# Patient Record
Sex: FEMALE | Race: WHITE | URBAN - METROPOLITAN AREA
[De-identification: names, ages, dates, MRNs, and addresses within clinical notes are randomized per-mention and may not be internally consistent; named-entity substitution may affect disease eponyms.]

---

## 2018-03-28 ENCOUNTER — OFFICE VISIT (OUTPATIENT)
Dept: NEUROSURGERY | Age: 83
End: 2018-03-28
Payer: MEDICARE

## 2018-03-28 VITALS — DIASTOLIC BLOOD PRESSURE: 70 MMHG | HEART RATE: 83 BPM | SYSTOLIC BLOOD PRESSURE: 131 MMHG

## 2018-03-28 DIAGNOSIS — M48.061 DEGENERATIVE LUMBAR SPINAL STENOSIS: ICD-10-CM

## 2018-03-28 PROCEDURE — 99203 OFFICE O/P NEW LOW 30 MIN: CPT | Performed by: NEUROLOGICAL SURGERY

## 2018-03-28 PROCEDURE — 4040F PNEUMOC VAC/ADMIN/RCVD: CPT | Performed by: NEUROLOGICAL SURGERY

## 2018-03-28 PROCEDURE — 1123F ACP DISCUSS/DSCN MKR DOCD: CPT | Performed by: NEUROLOGICAL SURGERY

## 2018-03-28 PROCEDURE — 1036F TOBACCO NON-USER: CPT | Performed by: NEUROLOGICAL SURGERY

## 2018-03-28 PROCEDURE — G8419 CALC BMI OUT NRM PARAM NOF/U: HCPCS | Performed by: NEUROLOGICAL SURGERY

## 2018-03-28 PROCEDURE — G8427 DOCREV CUR MEDS BY ELIG CLIN: HCPCS | Performed by: NEUROLOGICAL SURGERY

## 2018-03-28 PROCEDURE — G8484 FLU IMMUNIZE NO ADMIN: HCPCS | Performed by: NEUROLOGICAL SURGERY

## 2018-03-28 PROCEDURE — 1090F PRES/ABSN URINE INCON ASSESS: CPT | Performed by: NEUROLOGICAL SURGERY

## 2018-04-02 ENCOUNTER — TELEPHONE (OUTPATIENT)
Dept: NEUROLOGY | Age: 83
End: 2018-04-02

## 2018-04-04 ENCOUNTER — TELEPHONE (OUTPATIENT)
Dept: NEUROLOGY | Age: 83
End: 2018-04-04

## 2018-08-31 ENCOUNTER — APPOINTMENT (OUTPATIENT)
Dept: GENERAL RADIOLOGY | Age: 83
DRG: 056 | End: 2018-08-31
Payer: MEDICARE

## 2018-08-31 ENCOUNTER — HOSPITAL ENCOUNTER (INPATIENT)
Age: 83
LOS: 6 days | Discharge: NURSING, REHAB OR LONG-TERM CARE | DRG: 056 | End: 2018-09-06
Attending: EMERGENCY MEDICINE | Admitting: INTERNAL MEDICINE
Payer: MEDICARE

## 2018-08-31 ENCOUNTER — APPOINTMENT (OUTPATIENT)
Dept: CT IMAGING | Age: 83
DRG: 056 | End: 2018-08-31
Payer: MEDICARE

## 2018-08-31 DIAGNOSIS — R13.10 DYSPHAGIA, UNSPECIFIED TYPE: Primary | ICD-10-CM

## 2018-08-31 DIAGNOSIS — K59.00 CONSTIPATION, UNSPECIFIED CONSTIPATION TYPE: ICD-10-CM

## 2018-08-31 LAB
ALBUMIN SERPL-MCNC: 3.5 G/DL (ref 3.5–5.2)
ALP BLD-CCNC: 92 U/L (ref 35–104)
ALT SERPL-CCNC: 24 U/L (ref 0–32)
AMORPHOUS: ABNORMAL
ANION GAP SERPL CALCULATED.3IONS-SCNC: 15 MMOL/L (ref 7–16)
APTT: 27.7 SEC (ref 24.5–35.1)
AST SERPL-CCNC: 37 U/L (ref 0–31)
BACTERIA: ABNORMAL /HPF
BASOPHILS ABSOLUTE: 0.01 E9/L (ref 0–0.2)
BASOPHILS RELATIVE PERCENT: 0.1 % (ref 0–2)
BILIRUB SERPL-MCNC: 0.3 MG/DL (ref 0–1.2)
BILIRUBIN URINE: NEGATIVE
BLOOD, URINE: ABNORMAL
BUN BLDV-MCNC: 33 MG/DL (ref 8–23)
CALCIUM SERPL-MCNC: 9.7 MG/DL (ref 8.6–10.2)
CHLORIDE BLD-SCNC: 91 MMOL/L (ref 98–107)
CLARITY: ABNORMAL
CO2: 22 MMOL/L (ref 22–29)
COLOR: YELLOW
CREAT SERPL-MCNC: 1.1 MG/DL (ref 0.5–1)
EOSINOPHILS ABSOLUTE: 0 E9/L (ref 0.05–0.5)
EOSINOPHILS RELATIVE PERCENT: 0 % (ref 0–6)
GFR AFRICAN AMERICAN: 56
GFR NON-AFRICAN AMERICAN: 46 ML/MIN/1.73
GLUCOSE BLD-MCNC: 261 MG/DL (ref 74–109)
GLUCOSE URINE: NEGATIVE MG/DL
HCT VFR BLD CALC: 34.3 % (ref 34–48)
HEMOGLOBIN: 10.9 G/DL (ref 11.5–15.5)
IMMATURE GRANULOCYTES #: 0.03 E9/L
IMMATURE GRANULOCYTES %: 0.4 % (ref 0–5)
INR BLD: 1.1
KETONES, URINE: NEGATIVE MG/DL
LACTIC ACID: 2.6 MMOL/L (ref 0.5–2.2)
LEUKOCYTE ESTERASE, URINE: ABNORMAL
LIPASE: 42 U/L (ref 13–60)
LYMPHOCYTES ABSOLUTE: 0.93 E9/L (ref 1.5–4)
LYMPHOCYTES RELATIVE PERCENT: 12.4 % (ref 20–42)
MCH RBC QN AUTO: 29.8 PG (ref 26–35)
MCHC RBC AUTO-ENTMCNC: 31.8 % (ref 32–34.5)
MCV RBC AUTO: 93.7 FL (ref 80–99.9)
MONOCYTES ABSOLUTE: 0.23 E9/L (ref 0.1–0.95)
MONOCYTES RELATIVE PERCENT: 3.1 % (ref 2–12)
NEUTROPHILS ABSOLUTE: 6.33 E9/L (ref 1.8–7.3)
NEUTROPHILS RELATIVE PERCENT: 84 % (ref 43–80)
NITRITE, URINE: NEGATIVE
PDW BLD-RTO: 14.3 FL (ref 11.5–15)
PH UA: 6 (ref 5–9)
PLATELET # BLD: 363 E9/L (ref 130–450)
PMV BLD AUTO: 9.4 FL (ref 7–12)
POTASSIUM SERPL-SCNC: 5 MMOL/L (ref 3.5–5)
PROTEIN UA: 30 MG/DL
PROTHROMBIN TIME: 12.6 SEC (ref 9.3–12.4)
RBC # BLD: 3.66 E12/L (ref 3.5–5.5)
RBC UA: ABNORMAL /HPF (ref 0–2)
SODIUM BLD-SCNC: 128 MMOL/L (ref 132–146)
SPECIFIC GRAVITY UA: 1.02 (ref 1–1.03)
TOTAL PROTEIN: 7.3 G/DL (ref 6.4–8.3)
UROBILINOGEN, URINE: 0.2 E.U./DL
WBC # BLD: 7.5 E9/L (ref 4.5–11.5)
WBC UA: ABNORMAL /HPF (ref 0–5)

## 2018-08-31 PROCEDURE — 85610 PROTHROMBIN TIME: CPT

## 2018-08-31 PROCEDURE — 99285 EMERGENCY DEPT VISIT HI MDM: CPT

## 2018-08-31 PROCEDURE — 1200000000 HC SEMI PRIVATE

## 2018-08-31 PROCEDURE — 71045 X-RAY EXAM CHEST 1 VIEW: CPT

## 2018-08-31 PROCEDURE — 85025 COMPLETE CBC W/AUTO DIFF WBC: CPT

## 2018-08-31 PROCEDURE — 80053 COMPREHEN METABOLIC PANEL: CPT

## 2018-08-31 PROCEDURE — 70450 CT HEAD/BRAIN W/O DYE: CPT

## 2018-08-31 PROCEDURE — 74176 CT ABD & PELVIS W/O CONTRAST: CPT

## 2018-08-31 PROCEDURE — 81001 URINALYSIS AUTO W/SCOPE: CPT

## 2018-08-31 PROCEDURE — 83690 ASSAY OF LIPASE: CPT

## 2018-08-31 PROCEDURE — 85730 THROMBOPLASTIN TIME PARTIAL: CPT

## 2018-08-31 PROCEDURE — 36415 COLL VENOUS BLD VENIPUNCTURE: CPT

## 2018-08-31 PROCEDURE — 83605 ASSAY OF LACTIC ACID: CPT

## 2018-08-31 RX ORDER — METOPROLOL SUCCINATE 25 MG/1
12.5 TABLET, EXTENDED RELEASE ORAL DAILY
COMMUNITY

## 2018-08-31 RX ORDER — SODIUM CHLORIDE 0.9 % (FLUSH) 0.9 %
10 SYRINGE (ML) INJECTION PRN
Status: DISCONTINUED | OUTPATIENT
Start: 2018-08-31 | End: 2018-09-06 | Stop reason: HOSPADM

## 2018-08-31 RX ORDER — ATORVASTATIN CALCIUM 20 MG/1
20 TABLET, FILM COATED ORAL DAILY
COMMUNITY

## 2018-08-31 RX ORDER — ACETAMINOPHEN 325 MG/1
650 TABLET ORAL EVERY 4 HOURS PRN
Status: DISCONTINUED | OUTPATIENT
Start: 2018-08-31 | End: 2018-09-06 | Stop reason: HOSPADM

## 2018-08-31 RX ORDER — MIRTAZAPINE 30 MG/1
30 TABLET, FILM COATED ORAL NIGHTLY
Status: ON HOLD | COMMUNITY
End: 2018-09-06 | Stop reason: HOSPADM

## 2018-08-31 RX ORDER — DRONABINOL 5 MG/1
5 CAPSULE ORAL
Status: ON HOLD | COMMUNITY
End: 2018-09-06 | Stop reason: HOSPADM

## 2018-08-31 RX ORDER — CALCIUM CARBONATE 500(1250)
500 TABLET ORAL 2 TIMES DAILY
COMMUNITY

## 2018-08-31 RX ORDER — SODIUM CHLORIDE 0.9 % (FLUSH) 0.9 %
10 SYRINGE (ML) INJECTION EVERY 12 HOURS SCHEDULED
Status: DISCONTINUED | OUTPATIENT
Start: 2018-09-01 | End: 2018-09-01 | Stop reason: SDUPTHER

## 2018-08-31 RX ORDER — OXYCODONE HYDROCHLORIDE AND ACETAMINOPHEN 5; 325 MG/1; MG/1
1 TABLET ORAL 3 TIMES DAILY PRN
COMMUNITY

## 2018-09-01 ENCOUNTER — APPOINTMENT (OUTPATIENT)
Dept: GENERAL RADIOLOGY | Age: 83
DRG: 056 | End: 2018-09-01
Payer: MEDICARE

## 2018-09-01 PROBLEM — E87.20 LACTIC ACIDOSIS: Status: ACTIVE | Noted: 2018-09-01

## 2018-09-01 PROBLEM — E86.0 DEHYDRATION: Status: ACTIVE | Noted: 2018-09-01

## 2018-09-01 PROBLEM — E46 PROTEIN-CALORIE MALNUTRITION (HCC): Status: ACTIVE | Noted: 2018-09-01

## 2018-09-01 PROBLEM — E87.1 HYPONATREMIA: Status: ACTIVE | Noted: 2018-09-01

## 2018-09-01 LAB
ANION GAP SERPL CALCULATED.3IONS-SCNC: 16 MMOL/L (ref 7–16)
BUN BLDV-MCNC: 34 MG/DL (ref 8–23)
CALCIUM SERPL-MCNC: 9 MG/DL (ref 8.6–10.2)
CHLORIDE BLD-SCNC: 92 MMOL/L (ref 98–107)
CO2: 19 MMOL/L (ref 22–29)
CREAT SERPL-MCNC: 1 MG/DL (ref 0.5–1)
GFR AFRICAN AMERICAN: >60
GFR NON-AFRICAN AMERICAN: 52 ML/MIN/1.73
GLUCOSE BLD-MCNC: 123 MG/DL (ref 74–109)
LACTIC ACID: 0.8 MMOL/L (ref 0.5–2.2)
POTASSIUM SERPL-SCNC: 4.9 MMOL/L (ref 3.5–5)
SODIUM BLD-SCNC: 127 MMOL/L (ref 132–146)

## 2018-09-01 PROCEDURE — 83605 ASSAY OF LACTIC ACID: CPT

## 2018-09-01 PROCEDURE — 80048 BASIC METABOLIC PNL TOTAL CA: CPT

## 2018-09-01 PROCEDURE — 1200000000 HC SEMI PRIVATE

## 2018-09-01 PROCEDURE — 6370000000 HC RX 637 (ALT 250 FOR IP): Performed by: INTERNAL MEDICINE

## 2018-09-01 PROCEDURE — 74230 X-RAY XM SWLNG FUNCJ C+: CPT

## 2018-09-01 PROCEDURE — 2500000003 HC RX 250 WO HCPCS: Performed by: INTERNAL MEDICINE

## 2018-09-01 PROCEDURE — 97161 PT EVAL LOW COMPLEX 20 MIN: CPT

## 2018-09-01 PROCEDURE — 92611 MOTION FLUOROSCOPY/SWALLOW: CPT

## 2018-09-01 PROCEDURE — 6360000002 HC RX W HCPCS: Performed by: INTERNAL MEDICINE

## 2018-09-01 PROCEDURE — 36415 COLL VENOUS BLD VENIPUNCTURE: CPT

## 2018-09-01 PROCEDURE — 2700000000 HC OXYGEN THERAPY PER DAY

## 2018-09-01 PROCEDURE — 2580000003 HC RX 258: Performed by: INTERNAL MEDICINE

## 2018-09-01 RX ORDER — ONDANSETRON 2 MG/ML
4 INJECTION INTRAMUSCULAR; INTRAVENOUS EVERY 6 HOURS PRN
Status: DISCONTINUED | OUTPATIENT
Start: 2018-09-01 | End: 2018-09-06 | Stop reason: HOSPADM

## 2018-09-01 RX ORDER — ASPIRIN 81 MG/1
81 TABLET ORAL DAILY
Status: DISCONTINUED | OUTPATIENT
Start: 2018-09-01 | End: 2018-09-06 | Stop reason: HOSPADM

## 2018-09-01 RX ORDER — METOPROLOL SUCCINATE 25 MG/1
12.5 TABLET, EXTENDED RELEASE ORAL DAILY
Status: DISCONTINUED | OUTPATIENT
Start: 2018-09-01 | End: 2018-09-06 | Stop reason: HOSPADM

## 2018-09-01 RX ORDER — SODIUM CHLORIDE 0.9 % (FLUSH) 0.9 %
10 SYRINGE (ML) INJECTION PRN
Status: DISCONTINUED | OUTPATIENT
Start: 2018-09-01 | End: 2018-09-06 | Stop reason: HOSPADM

## 2018-09-01 RX ORDER — LEVOTHYROXINE SODIUM 0.05 MG/1
50 TABLET ORAL DAILY
Status: DISCONTINUED | OUTPATIENT
Start: 2018-09-01 | End: 2018-09-06 | Stop reason: HOSPADM

## 2018-09-01 RX ORDER — PETROLATUM 42 G/100G
OINTMENT TOPICAL PRN
Status: DISCONTINUED | OUTPATIENT
Start: 2018-09-01 | End: 2018-09-06 | Stop reason: HOSPADM

## 2018-09-01 RX ORDER — SODIUM CHLORIDE 9 MG/ML
INJECTION, SOLUTION INTRAVENOUS CONTINUOUS
Status: DISCONTINUED | OUTPATIENT
Start: 2018-09-01 | End: 2018-09-05

## 2018-09-01 RX ORDER — SODIUM CHLORIDE 0.9 % (FLUSH) 0.9 %
10 SYRINGE (ML) INJECTION EVERY 12 HOURS SCHEDULED
Status: DISCONTINUED | OUTPATIENT
Start: 2018-09-01 | End: 2018-09-06 | Stop reason: HOSPADM

## 2018-09-01 RX ADMIN — ENOXAPARIN SODIUM 30 MG: 100 INJECTION SUBCUTANEOUS at 11:00

## 2018-09-01 RX ADMIN — LEVOTHYROXINE SODIUM 50 MCG: 50 TABLET ORAL at 06:10

## 2018-09-01 RX ADMIN — BARIUM SULFATE 45 G: 0.6 CREAM ORAL at 10:03

## 2018-09-01 RX ADMIN — SODIUM CHLORIDE: 9 INJECTION, SOLUTION INTRAVENOUS at 03:21

## 2018-09-01 RX ADMIN — BARIUM SULFATE 45 G: 0.6 CREAM ORAL at 10:04

## 2018-09-01 RX ADMIN — Medication 10 ML: at 03:22

## 2018-09-01 ASSESSMENT — PAIN SCALES - GENERAL
PAINLEVEL_OUTOF10: 0

## 2018-09-01 NOTE — PROGRESS NOTES
Stephanie Macedo,    Your patient is on a medication that requires a renal dose adjustment. Renal Function Assessment:    Date Body Weight IBW Adj. Body Weight Scr CrCl Dialysis status   8/31/2018 36.3 kg 45.5 kg N/A 1.1 17 ml/min ___       Pharmacy has renally dose-adjusted the following medication(s):    Date Medication Original Dosing Regimen New Dosing Regimen   8/31/2018 Enoxaparin  40 mg Daily 30 mg Daily           These changes were made per protocol according to the [facility] Renal Function-Based Dose Adjustments Policy    *Please note this dose may need readjusted if your patient's renal function significantly improves. Please contact pharmacy with any questions regarding these changes.     Vincent Muñoz, PharmD  08/31/18 11:55 PM

## 2018-09-01 NOTE — PROGRESS NOTES
Physical Therapy    Facility/Department: [facility] SURG ONC  Initial Assessment    NAME: Rylie Alvarez  : 1924  MRN: [id number]    Date of Service: 2018    Patient Diagnosis(es): The primary encounter diagnosis was Dysphagia, unspecified type. A diagnosis of Constipation, unspecified constipation type was also pertinent to this visit. has a past medical history of Hypertension and Thyroid disease. has a past surgical history that includes Hysterectomy, total abdominal.    Evaluating Therapist: Ramin Howard PT    Room #:2819Q  DIAGNOSIS: Dysphagia  Additional Pertinent History:CVA  PRECAUTIONS:falls, O2    Social:  Pt admitted from [facility]. Per family pt is dependent with lift to transfer to wheelchair     Initial Evaluation  Date:    Was pt agreeable to Eval/treatment? yes   Does pt have pain? No c/o pain   Bed Mobility  Rolling: dependent  Supine to sit: dependent  Sit to supine: NT  Scooting: dependent   Transfers Sit to stand: dependent  Stand to sit: dependent  Stand pivot: dependent   Ambulation    NT   Stair Negotiation  Ascended and descended  NT   AM-PAC Raw score               6/24     Pt is alert, grossly oriented  LE ROM: R knee flexion contracture, -20 degrees extension. Flexor tone noted in R LE  LE strength: L LE 3+5  R LE 0/5  Balance: poor. Posterior loss of balance seated and standign  Edema: none  Endurance: fair    ASSESSMENT  Pt displays functional ability as noted in the objective portion of this evaluation. Patient and or family understand(s) diagnosis, prognosis, and plan of care. PLAN  No PT goals identified at this time, pt is currently at baseline level of function.     Ramin Howard PT  [id number]

## 2018-09-01 NOTE — PROGRESS NOTES
Family members x2 at bedside during admission to assist with database. Left message with Dr. Arash Guajardo answering service for admission orders. Electronically signed by Jefferey Apley, RN on 9/1/2018 at 12:43 AM    Dr. Giulia Escamilla returned call. He is going to enter orders.     Electronically signed by Jefferey Apley, RN on 9/1/2018 at 12:51 AM

## 2018-09-01 NOTE — H&P
smokeless tobacco. She reports that she does not drink alcohol or use drugs. Family History:   family history includes Cancer in her daughter; Early Death in her father; Heart Attack in her son. REVIEW OF SYSTEMS:  As above in the HPI, otherwise negative    PHYSICAL EXAM:    Vitals:  BP (!) 95/49   Pulse 98   Temp 97.1 °F (36.2 °C) (Temporal)   Resp 16   Ht 4' 11\" (1.499 m)   Wt 82 lb 12.8 oz (37.6 kg)   SpO2 100%   BMI 16.72 kg/m²     General:   Awake, alert, oriented X 3. No acute distress. Severely  malnourished  HEENT:    Normocephalic, atraumatic. Pupils equal, round, reactive to light. No scleral icterus. No conjunctival injection. Oropharynx clear. No nasal discharge. Neck:       Supple. No bruits, adenopathy, masses, neck vein distention. Trachea in the midline. Heart:      RRR, no murmurs, gallops, rubs  Lungs:     CTA bilaterally, bilat symmetrical expansion, no wheeze, rales, or rhonchi  Abdomen:   Bowel sounds present, soft, nontender, no masses, no organomegaly, no peritoneal signs  Extremities:  No clubbing, cyanosis, or edema  Skin:         Warm and dry, no open lesions or rash, poor turgor  Neuro:     Cranial nerves 2-12 intact, no focal deficits  Breast:    deferred  Rectal:    deferred  Genitalia:  deferred    LABS:    CBC with Differential:    Lab Results   Component Value Date    WBC 7.5 08/31/2018    RBC 3.66 08/31/2018    HGB 10.9 08/31/2018    HCT 34.3 08/31/2018     08/31/2018    MCV 93.7 08/31/2018    MCH 29.8 08/31/2018    MCHC 31.8 08/31/2018    RDW 14.3 08/31/2018    LYMPHOPCT 12.4 08/31/2018    MONOPCT 3.1 08/31/2018    BASOPCT 0.1 08/31/2018    MONOSABS 0.23 08/31/2018    LYMPHSABS 0.93 08/31/2018    EOSABS 0.00 08/31/2018    BASOSABS 0.01 08/31/2018     CMP:    Lab Results   Component Value Date     09/01/2018    K 4.9 09/01/2018    CL 92 09/01/2018    CO2 19 09/01/2018    BUN 34 09/01/2018    CREATININE 1.0 09/01/2018    GFRAA >60 09/01/2018    LABGLOM

## 2018-09-01 NOTE — ED PROVIDER NOTES
11.5 - 15.0 fL    Platelets [phone] - 398 E9/L    MPV 9.4 7.0 - 12.0 fL    Neutrophils % 84.0 (H) 43.0 - 80.0 %    Immature Granulocytes % 0.4 0.0 - 5.0 %    Lymphocytes % 12.4 (L) 20.0 - 42.0 %    Monocytes % 3.1 2.0 - 12.0 %    Eosinophils % 0.0 0.0 - 6.0 %    Basophils % 0.1 0.0 - 2.0 %    Neutrophils # 6.33 1.80 - 7.30 E9/L    Immature Granulocytes # 0.03 E9/L    Lymphocytes # 0.93 (L) 1.50 - 4.00 E9/L    Monocytes # 0.23 0.10 - 0.95 E9/L    Eosinophils # 0.00 (L) 0.05 - 0.50 E9/L    Basophils # 0.01 0.00 - 0.20 E9/L   Protime-INR   Result Value Ref Range    Protime 12.6 (H) 9.3 - 12.4 sec    INR 1.1    APTT   Result Value Ref Range    aPTT 27.7 24.5 - 35.1 sec   Lactic Acid, Plasma   Result Value Ref Range    Lactic Acid 2.6 (H) 0.5 - 2.2 mmol/L   Lipase   Result Value Ref Range    Lipase 42 13 - 60 U/L   Comprehensive Metabolic Panel   Result Value Ref Range    Sodium 128 (L) 132 - 146 mmol/L    Potassium 5.0 3.5 - 5.0 mmol/L    Chloride 91 (L) 98 - 107 mmol/L    CO2 22 22 - 29 mmol/L    Anion Gap 15 7 - 16 mmol/L    Glucose 261 (H) 74 - 109 mg/dL    BUN 33 (H) 8 - 23 mg/dL    CREATININE 1.1 (H) 0.5 - 1.0 mg/dL    GFR Non-African American 46 >=60 mL/min/1.73    GFR African American 56     Calcium 9.7 8.6 - 10.2 mg/dL    Total Protein 7.3 6.4 - 8.3 g/dL    Alb 3.5 3.5 - 5.2 g/dL    Total Bilirubin 0.3 0.0 - 1.2 mg/dL    Alkaline Phosphatase 92 35 - 104 U/L    ALT 24 0 - 32 U/L    AST 37 (H) 0 - 31 U/L       RADIOLOGY:  Interpreted by Radiologist.  XR CHEST PORTABLE   Final Result   No acute cardiopulmonary process      CT ABDOMEN PELVIS WO CONTRAST   Final Result      Findings for fecal rectal retention/impaction with the constipation. No free intraperitoneal air is observed. Can not identified on acute inflammatory process in the   omental/mesenteric fat planes. Presence air in intraspinal extradural space thoracolumbar spine some   of them crossing the neural foramina.  Etiology is not determined on   the present study. CT Head WO Contrast   Final Result      1. No findings to suggest acute intracranial edema or hemorrhage. 2. Area of remote/old stroke involving posterior left frontal lobe.          ------------------------- NURSING NOTES AND VITALS REVIEWED ---------------------------   The nursing notes within the ED encounter and vital signs as below have been reviewed. /66   Pulse 95   Temp 98 °F (36.7 °C) (Oral)   Resp 20   Ht 4' 11\" (1.499 m)   Wt 80 lb (36.3 kg)   SpO2 94%   BMI 16.16 kg/m²   Oxygen Saturation Interpretation: Normal      ---------------------------------------------------PHYSICAL EXAM--------------------------------------      Constitutional/General: Alert and oriented x3, well appearing, non toxic in NAD  Head: Normocephalic and atraumatic  Eyes: PERRL, EOMI  Mouth: Oropharynx clear, handling secretions, no trismus  Neck: Supple, full ROM,   Pulmonary: Lungs clear to auscultation bilaterally, no wheezes, rales, or rhonchi. Not in respiratory distress  Cardiovascular:  Regular rate and rhythm, no murmurs, gallops, or rubs. 2+ distal pulses  Abdomen: Soft, non tender, non distended,   Extremities: Moves all extremities x 4. Warm and well perfused  Skin: warm and dry without rash  Neurologic: GCS 15,  Psych: Normal Affect      ------------------------------ ED COURSE/MEDICAL DECISION MAKING----------------------  Medications - No data to display      ED COURSE:       Medical Decision Making:    Patient has had several days' history of worsening dysphagia that has gotten to the point that she cannot tolerate any oral intake. I am suspicious that this is from her prior stroke. She has no new neurologic deficits. Her imaging is reassuring. She does have evidence of constipation but no obstruction.  Talked with the family this sounds like a potentially chronic issue and the patient states that she has been having relatively normal stools were recently. I am mostly concerned that this patient cannot tolerate anything by mouth and the family would want everything done even including feeding tube. I contacted admitting physician covering for the patient's primary care physician at her facility, Dr. Khadra Hidalgo. Dr. Adam Villatoro will admit for further evaluation and management. Counseling: The emergency provider has spoken with the patient and discussed todays results, in addition to providing specific details for the plan of care and counseling regarding the diagnosis and prognosis. Questions are answered at this time and they are agreeable with the plan.      --------------------------------- IMPRESSION AND DISPOSITION ---------------------------------    IMPRESSION  1. Dysphagia, unspecified type    2. Constipation, unspecified constipation type        DISPOSITION  Disposition: Admit to med/surg floor  Patient condition is stable      NOTE: This report was transcribed using voice recognition software.  Every effort was made to ensure accuracy; however, inadvertent computerized transcription errors may be present        Dayana Sen MD  08/31/18 [id number]

## 2018-09-01 NOTE — PROGRESS NOTES
SPEECH/LANGUAGE PATHOLOGY  VIDEOFLUOROSCOPIC STUDY OF SWALLOWING (MBS)      PATIENT NAME:  Dwayne Haque      :  1924      TODAY'S DATE:  2018    SUMMARY OF EVALUATION     DYSPHAGIA DIAGNOSIS:  Mild oropharyngeal dysphagia     DIET RECOMMENDATIONS:  Dysphagia 1 diet (pureed foods) with regular consistency liquids. COMPENSATORY STRATEGIES:      [x]Double swallow with [x]all consistencies []thin []nectar []honey    []pureed []ground []chopped []soft solid []solid       []Multiple swallow (  Times) with []all consistencies []thin []nectar    []honey []pureed []ground []chopped []soft solid []solid     []Chin tuck with []all consistencies  []thin []nectar []honey []pureed    []ground []chopped []soft solid []solid      []Throat clear after with []all consistencies []thin []nectar []honey    []pureed []ground []chopped []soft solid []solid      []Effortful swallow with []all consistencies  []thin []nectar []honey    []pureed []ground []chopped []soft solid []solid     [x]Small bites/sips        [x]Alternate solids / liquids      []Check for oral pocketing     []No straw            []Spoon sip liquids        []       ASSISTANCE LEVEL:  []No assistance required   []Stand by assist   []Full assistance required  []Set up required   []Supervision with all PO intake    [] Malnutrition indicators have been identified and nursing has been notified to ensure a dietary consult is ordered.      THERAPY RECOMMENDATIONS:       [x]  Therapy is not recommended       []  Therapy is recommended to:     []  Improve oral motor strength and range of motion     []  Improve tongue base retraction      []  Improve laryngeal strength and range of motion     []  Address cricopharyngeal dysfunction (Shaker Exercises)    []  Mealtime assessment of patient's tolerance of prescribed diet     []  Repeat Video Swallowing Evaluation is recommended and requires a    Physician order    []Therapy at the discretion of facility/treating Speech straw       []DURING  the swallow for       []thin []nectar []honey[] pureed []solid       due to: []delayed laryngeal closure      []inadequate laryngeal closure        aspiration was        []trace []mild []moderate []marked []severe        and occurred       []inconsistently  []consistently []only with use of a straw. []AFTER the swallow for       []thin []nectar []honey[] pureed []solid)          due to: []pharyngeal residual       []redirection of bolus from the esophagus       aspiration was       []trace []mild []moderate []marked []severe )       and occurred      []inconsistently  []consistently []only with use of a straw       In response to aspiration  []A  spontaneous cough was noted        [] Spontaneous throat clearing was noted        [] An inconsistent cough was noted        [] A delayed cough was noted        []an absent cough/throat clear was noted    COMPENSATORY STRATEGIES       [x] Compensatory strategies that were beneficial included:      [x]alternating solids/liquids []cued redirective cough      []cued throat clear   []chin tuck       [x]double swallow   []multiple swallow     []other:      [x] Compensatory strategies that were not beneficial included:      []alternating solids/liquids []cued redirective cough      []cued throat clear   [x]chin tuck       []double swallow   []multiple swallow     []other:      [] Compensatory strategies were not attempted. STRUCTURAL/FUNCTIONAL ANOMALIES    [x]No structural/functional anomalies were noted      []Inadequate velopharyngeal closure resulting in nasopharyngeal reflux.        [] There was presence of Zenkers Diverticulum per Radiologist        []Comments:       CERVICAL ESOPHAGEAL STAGE :   [x]Adequate    []Inadequate    []Not Assessed     []Cervical osteophytes present per Radiologist   []Structural/mechanical abnormality in cervical esophagus per Radiologist  [] Redirection of bolus from the esophagus into pharynx Malnutrition indicators have been identified and nursing has been notified to ensure a dietary consult is ordered.      THERAPY RECOMMENDATIONS:       []  Therapy is not recommended       []  Therapy is recommended to:     []  Improve oral motor strength and range of motion     []  Improve tongue base retraction      []  Improve laryngeal strength and range of motion     []  Address cricopharyngeal dysfunction (Shaker Exercises)    []  Mealtime assessment of patient's tolerance of prescribed diet     []  Repeat Video Swallowing Evaluation is recommended and requires a    Physician order    []Therapy at the discretion of facility/treating Speech Pathologist                  PROCEDURE     Consistencies Administered During the Evaluation   Liquids: [x]Thin    []Nectar  [x]Honey   Solids:  [x]Pureed/Pudding  []Soft Solid  [x]Cookie   Other:       Method of Intake:   [x]Cup   [x]Spoon  [x]Straw  []Self Fed  [x]Fed by Clinician     Position:   [x]Upright seated  []Upright Standing  []Supine, elevated 45°   []Anterior/Posterior  [x]Lateral   []Oblique                   RESULTS     ORAL STAGE []Functional  []Abnormal      [] Dentition: ([]natural  []missing teeth []edentulous []partials []other )     [] Inadequate labial seal resulting anterior labial spillage from ([]right[] left   []midline )     [] Decreased mastication due to ([]poor/missing dentition     []decreased lingual control []cognitive status)      [] Delayed A-P transit due to ([]decreased initiation    [] reduced lingual strength[]cognitive function )     [] Decreased bolus formation resulting in premature pharyngeal spillage      [] Oral residuals:    []anterior sulcus  []sub lingually         []right buccal cavity  []left buccal cavity            []on tongue base       []throughout oral cavity       []on superior tongue   []on palate        []on velum          []Comments:        PHARYNGEAL STAGE     [] Functional  []Abnormal       ONSET TIME    [] Onset time of the pharyngeal swallow was adequate      [] Delayed initiation of the pharyngeal swallow      ([]mild []moderate []marked []severe []absent ). Swallow reflex was triggered at:        ([]tongue base []valleculae []pyriform sinus)      PHARYNGEAL RESIDUALS          Vallecula/Pharyngeal Wall    []No significant residuals were noted in the vallecula      []Reduced tongue base retraction resulting in:      []residuals in vallecula      []residual on posterior pharyngeal wall)     These residuals were noted for     []thin []nectar[] honey []pureed []solid)    Which    []cleared []did not clear []partially cleared []mostly cleared      With []cued []spontaneous    []double swallow []multiple swallow (  times)      []liquid chaser)      []Residuals in the vallecula due to inadequate epiglottic inversion       These residuals were noted for     []thin []nectar[] honey []pureed []solid)    Which    []cleared []did not clear []partially cleared []mostly cleared      With []cued []spontaneous    []double swallow []multiple swallow (  times)      []liquid chaser)        Pyriform Sinuses    []No significant residuals were noted in the pyriform sinuses      [] Residuals in the pyriform sinuses were noted due to      []pharyngeal weakness      []cricopharyngeal dysfunction. These residuals were noted for     []thin []nectar[] honey []pureed []solid)      Which    []cleared []did not clear []partially cleared []mostly cleared      With  []cued []spontaneous    []double swallow []multiple swallow (  times)      []liquid chaser)      LARYNGEAL PENETRATION   []Laryngeal penetration was not present during this evaluation      []Laryngeal penetration occurred prior to aspiration. Further details    under aspiration section.       []Laryngeal penetration occurred in the absence of aspiration:       []BEFORE the swallow for       []thin []nectar []honey[] pureed []solid           due to: [] decreased bolus formation      []premature pharyngeal entry       []delayed pharyngeal swallow           which  []cleared from the laryngeal vestibule spontaneously      (transient)     []cleared from the laryngeal vestibule with a cued,      re-directive throat clear       []remained in the laryngeal vestibule. []penetrated deep into the laryngeal vestibule       (to the level of the true vocal folds)      Laryngeal penetration was       []trace []mild []moderate []marked []severe          And occurred      []inconsistently  []consistently []only with use of a straw       []DURING  the swallow for       []thin []nectar []honey[] pureed []solid       due to: []delayed laryngeal closure      []inadequate laryngeal closure        which  []cleared from the laryngeal vestibule spontaneously     (transient)     []cleared from the laryngeal vestibule with a cued,      re-directive throat clear       []remained in the laryngeal vestibule. []penetrated deep into the laryngeal vestibule       (to the level of the true vocal folds)        Laryngeal penetration was        []trace []mild []moderate []marked []severe        and occurred       []inconsistently  []consistently []only with use of a straw. []AFTER the swallow for       []thin []nectar []honey[] pureed []solid)          due to: []pharyngeal residual       []redirection of bolus from the esophagus        which  []cleared from the laryngeal vestibule spontaneously     (transient)     []cleared from the laryngeal vestibule with a cued,      re-directive throat clear       []remained in the laryngeal vestibule.       []penetrated deep into the laryngeal vestibule       (to the level of the true vocal folds)      Laryngeal penetration was       []trace []mild []moderate []marked []severe )       and occurred      []inconsistently  []consistently []only with use of a straw       In response to laryngeal penetration,[]A  spontaneous cough was noted        [] Spontaneous throat clearing was noted        [] An inconsistent cough was noted        [] A delayed cough was noted        []an absent cough/throat clear was noted       ASPIRATION    []Aspiration was not present during this evaluation      []Aspiration was present during this evaluation:       []BEFORE the swallow for       []thin []nectar []honey[] pureed []solid           due to: []decreased bolus formation      []premature pharyngeal entry       []delayed pharyngeal swallow          aspiration was       []trace []mild []moderate []marked []severe          and occurred      []inconsistently  []consistently []only with use of a straw       []DURING  the swallow for       []thin []nectar []honey[] pureed []solid       due to: []delayed laryngeal closure      []inadequate laryngeal closure        aspiration was        []trace []mild []moderate []marked []severe        and occurred       []inconsistently  []consistently []only with use of a straw.           []AFTER the swallow for       []thin []nectar []honey[] pureed []solid)          due to: []pharyngeal residual       []redirection of bolus from the esophagus       aspiration was       []trace []mild []moderate []marked []severe )       and occurred      []inconsistently  []consistently []only with use of a straw       In response to aspiration  []A  spontaneous cough was noted        [] Spontaneous throat clearing was noted        [] An inconsistent cough was noted        [] A delayed cough was noted        []an absent cough/throat clear was noted    COMPENSATORY STRATEGIES       [] Compensatory strategies that were beneficial included:      []alternating solids/liquids []cued redirective cough      []cued throat clear   []chin tuck       []double swallow   []multiple swallow     []other:      [] Compensatory strategies that were not beneficial included:      []alternating solids/liquids []cued redirective cough      []cued throat clear   []chin tuck       []double swallow []multiple swallow     []other:      [] Compensatory strategies were not attempted. STRUCTURAL/FUNCTIONAL ANOMALIES    []No structural/functional anomalies were noted      []Inadequate velopharyngeal closure resulting in nasopharyngeal reflux. [] There was presence of Zenkers Diverticulum per Radiologist        []Comments:       CERVICAL ESOPHAGEAL STAGE :   []Adequate    []Inadequate    []Not Assessed     []Cervical osteophytes present per Radiologist   []Structural/mechanical abnormality in cervical esophagus per Radiologist  [] Redirection of bolus from the esophagus into pharynx                                []  Prognosis for improvements is   []  This plan will be re-evaluated and revised in 1 week  if warranted. []  Patient stated goals:   []  Treatment goals discussed with [] patient/  [] family. []  The []  patient/ []  family understand the diagnosis, prognosis and plan of care. [x]The admitting diagnosis and active problem list, as listed below have been reviewed prior to initiation of this evaluation.      ADMITTING DIAGNOSIS: Dysphagia [R13.10]     ACTIVE PROBLEM LIST:   Patient Active Problem List   Diagnosis    Fall    Closed fracture of multiple ribs of left side    Trauma    Degenerative lumbar spinal stenosis    Dysphagia    Dehydration    Protein-calorie malnutrition ([facility] [address]

## 2018-09-01 NOTE — PROGRESS NOTES
Disimpacted rectum. Small amount of soft, brown stool removed.     Electronically signed by Katerine Fowler RN on 9/1/2018 at 3:46 AM

## 2018-09-02 LAB
ANION GAP SERPL CALCULATED.3IONS-SCNC: 11 MMOL/L (ref 7–16)
BUN BLDV-MCNC: 30 MG/DL (ref 8–23)
CALCIUM SERPL-MCNC: 8.6 MG/DL (ref 8.6–10.2)
CHLORIDE BLD-SCNC: 100 MMOL/L (ref 98–107)
CO2: 18 MMOL/L (ref 22–29)
CREAT SERPL-MCNC: 0.6 MG/DL (ref 0.5–1)
GFR AFRICAN AMERICAN: >60
GFR NON-AFRICAN AMERICAN: >60 ML/MIN/1.73
GLUCOSE BLD-MCNC: 103 MG/DL (ref 74–109)
POTASSIUM SERPL-SCNC: 4.8 MMOL/L (ref 3.5–5)
SODIUM BLD-SCNC: 129 MMOL/L (ref 132–146)

## 2018-09-02 PROCEDURE — 1200000000 HC SEMI PRIVATE

## 2018-09-02 PROCEDURE — 6360000002 HC RX W HCPCS: Performed by: INTERNAL MEDICINE

## 2018-09-02 PROCEDURE — 80048 BASIC METABOLIC PNL TOTAL CA: CPT

## 2018-09-02 PROCEDURE — 6370000000 HC RX 637 (ALT 250 FOR IP): Performed by: INTERNAL MEDICINE

## 2018-09-02 PROCEDURE — 36415 COLL VENOUS BLD VENIPUNCTURE: CPT

## 2018-09-02 PROCEDURE — 2700000000 HC OXYGEN THERAPY PER DAY

## 2018-09-02 PROCEDURE — 2580000003 HC RX 258: Performed by: INTERNAL MEDICINE

## 2018-09-02 RX ADMIN — ENOXAPARIN SODIUM 30 MG: 100 INJECTION SUBCUTANEOUS at 10:42

## 2018-09-02 RX ADMIN — METOPROLOL SUCCINATE 12.5 MG: 25 TABLET, FILM COATED, EXTENDED RELEASE ORAL at 10:42

## 2018-09-02 RX ADMIN — SODIUM CHLORIDE: 9 INJECTION, SOLUTION INTRAVENOUS at 11:05

## 2018-09-02 RX ADMIN — LEVOTHYROXINE SODIUM 50 MCG: 50 TABLET ORAL at 08:11

## 2018-09-02 RX ADMIN — ASPIRIN 81 MG: 81 TABLET ORAL at 10:42

## 2018-09-02 ASSESSMENT — PAIN SCALES - GENERAL
PAINLEVEL_OUTOF10: 0

## 2018-09-02 NOTE — PROGRESS NOTES
Nutrition Assessment    Type and Reason for Visit: Initial, Consult    Nutrition Recommendations: Continue current diet, Continue current ONS, Ensure TID    Malnutrition Assessment:  · Malnutrition Status: Meets the criteria for severe malnutrition  · Context: Chronic illness  · Findings of the 6 clinical characteristics of malnutrition (Minimum of 2 out of 6 clinical characteristics is required to make the diagnosis of moderate or severe Protein Calorie Malnutrition based on AND/ASPEN Guidelines):  1. Energy Intake-51% to 75%, not able to assess    2. Weight Loss-5% loss or greater,  (10 months)  3. Fat Loss-Moderate subcutaneous fat loss, Orbital  4. Muscle Loss-Moderate muscle mass loss, Clavicles (pectoralis and deltoids), Temples (temporalis muscle), Interosseous  5. Fluid Accumulation-No significant fluid accumulation,    6.  Strength-Not measured    Nutrition Diagnosis:   · Problem: Severe malnutrition, in context of acute illness or injury  · Etiology: related to Difficulty swallowing     Signs and symptoms:  as evidenced by Swallow study results, Weight loss, Intake 50-75%, Diet history of poor intake, Severe loss of subcutaneous fat, Severe muscle loss (5% weight loss x 10 months)    Nutrition Assessment:  · Subjective Assessment: Laying in bed quietly.   · Nutrition-Focused Physical Findings: Poor po pta, N/V, weakness, dehydration, dysphagia, appears malnourished, + I/O  · Wound Type: None  · Current Nutrition Therapies:  · Oral Diet Orders: Dysphagia 1 (Pureed)   · Oral Diet intake: 26-50%  · Oral Nutrition Supplement (ONS) Orders: Standard High Calorie Oral Supplement  · ONS intake: %  · Anthropometric Measures:  · Ht: 4' 11\" (149.9 cm)   · Current Body Wt: 82 lb (37.2 kg) (9/1 bed scale )  · Admission Body Wt: 82 lb (37.2 kg) (first actual)  · Usual Body Wt: 86 lb (39 kg) (from EMR 11/2017, office visit)  · % Weight Change: 5% weight ,  ~ 10 months  · Ideal Body Wt: 98 lb (44.5 kg), %

## 2018-09-02 NOTE — PLAN OF CARE
Problem: Nutrition  Goal: Optimal nutrition therapy  Outcome: Ongoing  Nutrition Problem: Severe malnutrition, in context of acute illness or injury  Intervention: Food and/or Nutrient Delivery: Continue current diet, Continue current ONS (Ensure TID)  Nutritional Goals: Consume > 75% of meals/ONS.

## 2018-09-03 LAB
ANION GAP SERPL CALCULATED.3IONS-SCNC: 12 MMOL/L (ref 7–16)
BUN BLDV-MCNC: 29 MG/DL (ref 8–23)
CALCIUM SERPL-MCNC: 8.3 MG/DL (ref 8.6–10.2)
CHLORIDE BLD-SCNC: 102 MMOL/L (ref 98–107)
CO2: 21 MMOL/L (ref 22–29)
CREAT SERPL-MCNC: 0.5 MG/DL (ref 0.5–1)
GFR AFRICAN AMERICAN: >60
GFR NON-AFRICAN AMERICAN: >60 ML/MIN/1.73
GLUCOSE BLD-MCNC: 105 MG/DL (ref 74–109)
POTASSIUM SERPL-SCNC: 4.4 MMOL/L (ref 3.5–5)
SODIUM BLD-SCNC: 135 MMOL/L (ref 132–146)

## 2018-09-03 PROCEDURE — 1200000000 HC SEMI PRIVATE

## 2018-09-03 PROCEDURE — 80048 BASIC METABOLIC PNL TOTAL CA: CPT

## 2018-09-03 PROCEDURE — 6360000002 HC RX W HCPCS: Performed by: INTERNAL MEDICINE

## 2018-09-03 PROCEDURE — 6370000000 HC RX 637 (ALT 250 FOR IP): Performed by: INTERNAL MEDICINE

## 2018-09-03 PROCEDURE — 36415 COLL VENOUS BLD VENIPUNCTURE: CPT

## 2018-09-03 PROCEDURE — 2700000000 HC OXYGEN THERAPY PER DAY

## 2018-09-03 RX ADMIN — ENOXAPARIN SODIUM 30 MG: 100 INJECTION SUBCUTANEOUS at 10:22

## 2018-09-03 RX ADMIN — LEVOTHYROXINE SODIUM 50 MCG: 50 TABLET ORAL at 07:17

## 2018-09-03 RX ADMIN — ASPIRIN 81 MG: 81 TABLET ORAL at 10:22

## 2018-09-03 RX ADMIN — METOPROLOL SUCCINATE 12.5 MG: 25 TABLET, FILM COATED, EXTENDED RELEASE ORAL at 10:22

## 2018-09-03 RX ADMIN — PETROLATUM: 42 OINTMENT TOPICAL at 20:57

## 2018-09-03 ASSESSMENT — PAIN DESCRIPTION - ORIENTATION: ORIENTATION: RIGHT;LEFT

## 2018-09-03 ASSESSMENT — PAIN DESCRIPTION - PROGRESSION: CLINICAL_PROGRESSION: NOT CHANGED

## 2018-09-03 ASSESSMENT — PAIN SCALES - GENERAL
PAINLEVEL_OUTOF10: 0
PAINLEVEL_OUTOF10: 3
PAINLEVEL_OUTOF10: 0

## 2018-09-03 ASSESSMENT — PAIN DESCRIPTION - ONSET: ONSET: ON-GOING

## 2018-09-03 ASSESSMENT — PAIN DESCRIPTION - LOCATION: LOCATION: LEG

## 2018-09-03 ASSESSMENT — PAIN DESCRIPTION - DESCRIPTORS: DESCRIPTORS: DISCOMFORT;PATIENT UNABLE TO DESCRIBE

## 2018-09-03 ASSESSMENT — PAIN DESCRIPTION - PAIN TYPE: TYPE: CHRONIC PAIN

## 2018-09-03 ASSESSMENT — PAIN DESCRIPTION - FREQUENCY: FREQUENCY: INTERMITTENT

## 2018-09-03 NOTE — PROGRESS NOTES
Chief Complaint:  Unable to swallow, malnutrition    Subjective: The patient is alert. No problems overnight. Denies chest pain & SOB . Denies abdominal pain. Tolerating diet. No nausea or vomiting. Son at the bedside    Objective:    Vitals:    18 0815   BP: (!) 111/57   Pulse: 64   Resp: 16   Temp: 97.6 °F (36.4 °C)   SpO2:      A&O x's 3, frail, ill appearing  Heart:  RRR, no murmurs, gallops, or rubs. Lungs:  CTA bilaterally, no wheeze, rales or rhonchi  Abd: bowel sounds present, nontender, nondistended, no masses  Extrem:  No clubbing, cyanosis, or edema      Lab Results   Component Value Date     2018    K 4.4 2018     2018    CO2 21 2018    BUN 29 2018    CREATININE 0.5 2018    CALCIUM 8.3 2018      Lab Results   Component Value Date    WBC 7.5 2018    RBC 3.66 2018    HGB 10.9 2018    HCT 34.3 2018    MCV 93.7 2018    MCH 29.8 2018    MCHC 31.8 2018    RDW 14.3 2018     2018    MPV 9.4 2018     PT/INR:    Lab Results   Component Value Date    PROTIME 12.6 2018    INR 1.1 2018     No results for input(s): POCGLU in the last 72 hours. Recent Labs      18   2015  18   0619  18   0639  18   0604   NA  128*  127*  129*  135   K  5.0  4.9  4.8  4.4   CL  91*  92*  100  102   CO2  22  19*  18*  21*   BUN  33*  34*  30*  29*   LABALBU  3.5   --    --    --    CREATININE  1.1*  1.0  0.6  0.5   CALCIUM  9.7  9.0  8.6  8.3*   GFRAA  56  >60  >60  >60   LABGLOM  46  52  >60  >60   GLUCOSE  261*  123*  103  105       Ct Abdomen Pelvis Wo Contrast    Result Date: 2018  Patient MRN:  [id number] : 1924 Age: 80 years Gender: Female Order Date:  2018 8:15 PM TECHNIQUE/NUMBER OF IMAGES/COMPARISON/CLINICAL HISTORY: CT abdomen and pelvis Axial images with sagittal and coronal reconstructions Abdominal pain.  A 80year-old female patient, patient had previous hysterectomy. 264 images FINDINGS: There are motion artifacts in some areas which can obscures fine detail. This study demonstrates a pattern of severe constipation with fecal rectal rotation and impaction. There is no signs for obstructive uropathy. Exam preserved size for the age group and with preserved cortical thickness. There are normal size and density for the liver, spleen. The pancreas demonstrated unremarkable appearance for the age group. Gallbladder is well distended. The biliary tree and pancreatic duct systems are not dilated. Calcified atheroma changes are seen in the abdominal aorta. Slight fullness of the adrenal glands are seen. No acute inflammatory changes seen in the omental mesenteric fat planes. There is no free intraperitoneal air. Lower lung bases demonstrate no significant findings. Bone structures demonstrate degenerative changes in the lumbar spine L4-5 and L5-S1 level. Patient has a left hip internal fixation. Note is the presence of air in the intraspinal extradural space in the thoracolumbar spine. Some of the areas crossing the neural foramina but does not for retroperitoneal air conspicuous. No conspicuous vaccum phenomena are seen in the disc spaces. Findings for fecal rectal retention/impaction with the constipation. No free intraperitoneal air is observed. Can not identified on acute inflammatory process in the omental/mesenteric fat planes. Presence air in intraspinal extradural space thoracolumbar spine some of them crossing the neural foramina. Etiology is not determined on the present study. Ct Head Wo Contrast    Result Date: 2018  : 1924 Age: 80 years Gender: Female Order Date:  2018 8:15 PM EXAM: CT HEAD WO CONTRAST COMPARISON: None INDICATION:  dysphagia  FINDINGS: Area of encephalomalacic changes seen involving posterior left frontal cortex and subcortical white matter. No acute intracranial hemorrhage or edema.  No abnormal

## 2018-09-03 NOTE — PROGRESS NOTES
Department of Surgery   General Surgery  Dr. Dallin Eaton Progress Note      SUBJECTIVE:  Pt and family present;  Very little PO intake; No coughing, emesis    OBJECTIVE      Physical    VITALS:  BP (!) 111/57   Pulse 64   Temp 97.6 °F (36.4 °C) (Oral)   Resp 16   Ht 4' 11\" (1.499 m)   Wt 82 lb 12.8 oz (37.6 kg)   SpO2 99%   BMI 16.72 kg/m²   INTAKE/OUTPUT:  No intake or output data in the 24 hours ending 18 1220  URINARY CATHETER OUTPUT (Nielsen):   [  DRAIN/TUBE OUTPUT:   [  TEMPERATURE:  Current - Temp: 97.6 °F (36.4 °C); Max - Temp  Av.1 °F (36.7 °C)  Min: 97.6 °F (36.4 °C)  Max: 98.9 °F (37.2 °C)  RESPIRATIONS RANGE: Resp  Av  Min: 16  Max: 16  PULSE RANGE: Pulse  Av.5  Min: 64  Max: 76  BLOOD PRESSURE RANGE:  Systolic (79RTA), YDQ:045 , Min:95 , PXM:526   ; Diastolic (62HDX), XSI:36, Min:51, Max:65    PULSE OXIMETRY RANGE: SpO2  Av.5 %  Min: 94 %  Max: 99 %    General Appearance:  awake, alert, oriented, in no acute distress  Skin:  Skin color, texture, turgor normal. No rashes or lesions. Back:  mild pain to palpation in the LS spine midline  Lungs:  Normal expansion. Clear to auscultation. No rales, rhonchi, or wheezing. Heart:  Heart sounds are normal.  Regular rate and rhythm without murmur, gallop or rub. Abdomen:  Soft, non-tender, normal bowel sounds. No bruits, organomegaly or masses. Extremities: Extremities warm to touch, pink, with no edema.   Musculoskeletal:  negative  Peripheral Pulses:  Capillary refill <2secs, strong peripheral pulses  Neurologic:  negative    Data  CBC with Differential:    Lab Results   Component Value Date    WBC 7.5 2018    RBC 3.66 2018    HGB 10.9 2018    HCT 34.3 2018     2018    MCV 93.7 2018    MCH 29.8 2018    MCHC 31.8 2018    RDW 14.3 2018    LYMPHOPCT 12.4 2018    MONOPCT 3.1 2018    BASOPCT 0.1 2018    MONOSABS 0.23 2018    LYMPHSABS 0.93

## 2018-09-04 ENCOUNTER — ANESTHESIA (OUTPATIENT)
Dept: ENDOSCOPY | Age: 83
DRG: 056 | End: 2018-09-04
Payer: MEDICARE

## 2018-09-04 ENCOUNTER — ANESTHESIA EVENT (OUTPATIENT)
Dept: ENDOSCOPY | Age: 83
DRG: 056 | End: 2018-09-04
Payer: MEDICARE

## 2018-09-04 VITALS — DIASTOLIC BLOOD PRESSURE: 79 MMHG | OXYGEN SATURATION: 98 % | SYSTOLIC BLOOD PRESSURE: 160 MMHG

## 2018-09-04 LAB
ANION GAP SERPL CALCULATED.3IONS-SCNC: 13 MMOL/L (ref 7–16)
BUN BLDV-MCNC: 25 MG/DL (ref 8–23)
CALCIUM SERPL-MCNC: 7.8 MG/DL (ref 8.6–10.2)
CHLORIDE BLD-SCNC: 102 MMOL/L (ref 98–107)
CO2: 19 MMOL/L (ref 22–29)
CREAT SERPL-MCNC: 0.4 MG/DL (ref 0.5–1)
GFR AFRICAN AMERICAN: >60
GFR NON-AFRICAN AMERICAN: >60 ML/MIN/1.73
GLUCOSE BLD-MCNC: 87 MG/DL (ref 74–109)
POTASSIUM SERPL-SCNC: 4.2 MMOL/L (ref 3.5–5)
SODIUM BLD-SCNC: 134 MMOL/L (ref 132–146)

## 2018-09-04 PROCEDURE — 3700000000 HC ANESTHESIA ATTENDED CARE: Performed by: SURGERY

## 2018-09-04 PROCEDURE — 6370000000 HC RX 637 (ALT 250 FOR IP): Performed by: STUDENT IN AN ORGANIZED HEALTH CARE EDUCATION/TRAINING PROGRAM

## 2018-09-04 PROCEDURE — 3700000001 HC ADD 15 MINUTES (ANESTHESIA): Performed by: SURGERY

## 2018-09-04 PROCEDURE — 6360000002 HC RX W HCPCS: Performed by: STUDENT IN AN ORGANIZED HEALTH CARE EDUCATION/TRAINING PROGRAM

## 2018-09-04 PROCEDURE — 2580000003 HC RX 258: Performed by: INTERNAL MEDICINE

## 2018-09-04 PROCEDURE — 3609018000 HC EGD ESOPHAGOGASTRODUODENOSCOPY PEG TUBE INSERTION: Performed by: SURGERY

## 2018-09-04 PROCEDURE — 7100000000 HC PACU RECOVERY - FIRST 15 MIN: Performed by: SURGERY

## 2018-09-04 PROCEDURE — L0625 LO FLEX L1-BELOW L5 PRE OTS: HCPCS | Performed by: SURGERY

## 2018-09-04 PROCEDURE — 7100000001 HC PACU RECOVERY - ADDTL 15 MIN: Performed by: SURGERY

## 2018-09-04 PROCEDURE — 1200000000 HC SEMI PRIVATE

## 2018-09-04 PROCEDURE — 2580000003 HC RX 258: Performed by: STUDENT IN AN ORGANIZED HEALTH CARE EDUCATION/TRAINING PROGRAM

## 2018-09-04 PROCEDURE — 2740000010 HC MISC ORTHOTIC: Performed by: SURGERY

## 2018-09-04 PROCEDURE — 6370000000 HC RX 637 (ALT 250 FOR IP): Performed by: INTERNAL MEDICINE

## 2018-09-04 PROCEDURE — 0DH63UZ INSERTION OF FEEDING DEVICE INTO STOMACH, PERCUTANEOUS APPROACH: ICD-10-PCS | Performed by: SURGERY

## 2018-09-04 PROCEDURE — 36415 COLL VENOUS BLD VENIPUNCTURE: CPT

## 2018-09-04 PROCEDURE — 80048 BASIC METABOLIC PNL TOTAL CA: CPT

## 2018-09-04 RX ORDER — HYDROCODONE BITARTRATE AND ACETAMINOPHEN 5; 325 MG/1; MG/1
1 TABLET ORAL PRN
Status: DISCONTINUED | OUTPATIENT
Start: 2018-09-04 | End: 2018-09-04

## 2018-09-04 RX ORDER — HYDROCODONE BITARTRATE AND ACETAMINOPHEN 5; 325 MG/1; MG/1
1 TABLET ORAL ONCE
Status: COMPLETED | OUTPATIENT
Start: 2018-09-04 | End: 2018-09-04

## 2018-09-04 RX ORDER — MORPHINE SULFATE 2 MG/ML
2 INJECTION, SOLUTION INTRAMUSCULAR; INTRAVENOUS EVERY 5 MIN PRN
Status: DISCONTINUED | OUTPATIENT
Start: 2018-09-04 | End: 2018-09-04

## 2018-09-04 RX ORDER — HYDROCODONE BITARTRATE AND ACETAMINOPHEN 5; 325 MG/1; MG/1
2 TABLET ORAL PRN
Status: DISCONTINUED | OUTPATIENT
Start: 2018-09-04 | End: 2018-09-04

## 2018-09-04 RX ORDER — MEPERIDINE HYDROCHLORIDE 50 MG/ML
12.5 INJECTION INTRAMUSCULAR; INTRAVENOUS; SUBCUTANEOUS EVERY 5 MIN PRN
Status: DISCONTINUED | OUTPATIENT
Start: 2018-09-04 | End: 2018-09-04

## 2018-09-04 RX ORDER — MORPHINE SULFATE 2 MG/ML
1 INJECTION, SOLUTION INTRAMUSCULAR; INTRAVENOUS EVERY 5 MIN PRN
Status: DISCONTINUED | OUTPATIENT
Start: 2018-09-04 | End: 2018-09-04

## 2018-09-04 RX ORDER — PROMETHAZINE HYDROCHLORIDE 25 MG/ML
6.25 INJECTION, SOLUTION INTRAMUSCULAR; INTRAVENOUS EVERY 10 MIN PRN
Status: DISCONTINUED | OUTPATIENT
Start: 2018-09-04 | End: 2018-09-04

## 2018-09-04 RX ADMIN — SODIUM CHLORIDE: 9 INJECTION, SOLUTION INTRAVENOUS at 04:21

## 2018-09-04 RX ADMIN — ACETAMINOPHEN 650 MG: 325 TABLET, FILM COATED ORAL at 23:01

## 2018-09-04 RX ADMIN — PETROLATUM: 42 OINTMENT TOPICAL at 04:21

## 2018-09-04 RX ADMIN — HYDROCODONE BITARTRATE AND ACETAMINOPHEN 1 TABLET: 5; 325 TABLET ORAL at 19:04

## 2018-09-04 RX ADMIN — ACETAMINOPHEN 650 MG: 325 TABLET, FILM COATED ORAL at 18:20

## 2018-09-04 RX ADMIN — PETROLATUM: 42 OINTMENT TOPICAL at 08:39

## 2018-09-04 RX ADMIN — CEFAZOLIN SODIUM 2 G: 10 POWDER, FOR SOLUTION INTRAVENOUS at 15:27

## 2018-09-04 RX ADMIN — ACETAMINOPHEN 650 MG: 325 TABLET, FILM COATED ORAL at 11:49

## 2018-09-04 RX ADMIN — METOPROLOL SUCCINATE 12.5 MG: 25 TABLET, FILM COATED, EXTENDED RELEASE ORAL at 08:33

## 2018-09-04 ASSESSMENT — PAIN SCALES - PAIN ASSESSMENT IN ADVANCED DEMENTIA (PAINAD)
FACIALEXPRESSION: 0
FACIALEXPRESSION: 1
TOTALSCORE: 2
NEGVOCALIZATION: 1
FACIALEXPRESSION: 0
BREATHING: 0
CONSOLABILITY: 0
TOTALSCORE: 0
BREATHING: 0
TOTALSCORE: 0
CONSOLABILITY: 0
BREATHING: 0
NEGVOCALIZATION: 0
BODYLANGUAGE: 0
NEGVOCALIZATION: 0
BODYLANGUAGE: 0
CONSOLABILITY: 0
BODYLANGUAGE: 0

## 2018-09-04 ASSESSMENT — PAIN DESCRIPTION - FREQUENCY: FREQUENCY: CONTINUOUS

## 2018-09-04 ASSESSMENT — PAIN SCALES - GENERAL
PAINLEVEL_OUTOF10: 0
PAINLEVEL_OUTOF10: 10
PAINLEVEL_OUTOF10: 5
PAINLEVEL_OUTOF10: 10
PAINLEVEL_OUTOF10: 0
PAINLEVEL_OUTOF10: 10

## 2018-09-04 ASSESSMENT — PAIN DESCRIPTION - ONSET: ONSET: ON-GOING

## 2018-09-04 ASSESSMENT — PAIN DESCRIPTION - ORIENTATION: ORIENTATION: RIGHT;LEFT

## 2018-09-04 ASSESSMENT — PAIN DESCRIPTION - PROGRESSION: CLINICAL_PROGRESSION: GRADUALLY WORSENING

## 2018-09-04 ASSESSMENT — PAIN DESCRIPTION - PAIN TYPE: TYPE: ACUTE PAIN

## 2018-09-04 ASSESSMENT — PAIN DESCRIPTION - LOCATION: LOCATION: LEG

## 2018-09-04 ASSESSMENT — PAIN DESCRIPTION - DESCRIPTORS: DESCRIPTORS: ACHING;CONSTANT;DISCOMFORT;TENDER

## 2018-09-04 NOTE — PROGRESS NOTES
Chart reviewed for PEG today  Patient resting comfortably. NPO until after procedure.     Electronically signed by Jenny Null MD on 9/4/2018 at 7:26 AM

## 2018-09-04 NOTE — PROGRESS NOTES
Spoke with Dr. Renata Hutchison re: pt's BP. No new orders at this time.     Electronically signed by Jaskaran Collins RN on 9/3/2018 at 9:32 PM

## 2018-09-04 NOTE — PROGRESS NOTES
Nutrition Assessment    Type and Reason for Visit: Reassess    Nutrition Recommendations: Start TF as able s/p PEG placement, Restart diet as safely tolerated  Recommend Standard w/ Fiber @ 45 ml/hr x 23 hr (hold 30 minutes before/after synthroid) to provide 1035 ml tv, 1242 kcal, 57 gm pro, 835 ml free water   When no IVF, 120 ml H2O flush q6 hr to provide 1315 ml total water (~35 ml/kg CBW)    Monitor PO intake for possible transition to nocturnal feeding    Malnutrition Assessment:  · Malnutrition Status: Meets the criteria for severe malnutrition  · Context: Chronic illness  · Findings of the 6 clinical characteristics of malnutrition (Minimum of 2 out of 6 clinical characteristics is required to make the diagnosis of moderate or severe Protein Calorie Malnutrition based on AND/ASPEN Guidelines):  1. Energy Intake-Less than or equal to 75%, greater than or equal to 3 months    2. Weight Loss-5% loss or greater,  (10 months)  3. Fat Loss-Severe subcutaneous fat loss, Orbital, Triceps  4. Muscle Loss-Severe muscle mass loss, Temples (temporalis muscle), Clavicles (pectoralis and deltoids), Interosseous  5. Fluid Accumulation-No significant fluid accumulation    6.   Strength-Not measured    Nutrition Diagnosis:   · Problem: Severe malnutrition, in context of chronic illness  · Etiology: related to Insufficient energy/nutrient consumption     Signs and symptoms:  as evidenced by Diet history of poor intake, Weight loss, Severe loss of subcutaneous fat, Severe muscle loss    Nutrition Assessment:  · Subjective Assessment: pt sleeping soundly w/ family at bedside  · Nutrition-Focused Physical Findings: noted intermittent confusion, new PEG in place 2/2 dysphagia w/ poor PO intake, soft abd, +I/Os  · Wound Type: None  · Current Nutrition Therapies:  · Oral Diet Orders: NPO   · Anthropometric Measures:  · Ht: 4' 11\" (149.9 cm)   · Current Body Wt: 83 lb (37.6 kg) (bed scale 9/4)  · Admission Body Wt: 82 lb

## 2018-09-04 NOTE — PLAN OF CARE
Problem: Nutrition  Goal: Optimal nutrition therapy  Outcome: Ongoing  Nutrition Problem: Severe malnutrition, in context of chronic illness  Nutritional Goals:  Tolerance to TF at goal rate

## 2018-09-04 NOTE — PLAN OF CARE
Problem: Falls - Risk of:  Goal: Will remain free from falls  Will remain free from falls    Outcome: Met This Shift

## 2018-09-04 NOTE — CARE COORDINATION
Social Work/Discharge Planning    Pt is from [facility]. ANDREW called liajamison Mcnulty to discuss bed status, no answer- left message. ANDREW following, awaiting return call.     Electronically signed by Joselyn Souza on 9/4/2018 at 10:34 AM]

## 2018-09-04 NOTE — ANESTHESIA POSTPROCEDURE EVALUATION
Department of Anesthesiology  Postprocedure Note    Patient: Db Lacy  MRN: [id number]  YOB: 1924  Date of evaluation: 9/4/2018  Time:  4:24 PM     Procedure Summary     Date:  09/04/18 Room / Location:  [facility] ENDO 02 / [facility] ENDOSCOPY    Anesthesia Start:  1402 Anesthesia Stop:      Procedure:  EGD PEG-DR KEY 2PM (N/A ) Diagnosis:  (malnutrition/dysphagia)    Surgeon:  Taina Dorado MD Responsible Provider:  Tali Jules MD    Anesthesia Type:  MAC ASA Status:  3          Anesthesia Type: MAC    Patti Phase I: Patti Score: 10    Patti Phase II:      Last vitals: Reviewed and per EMR flowsheets.        Anesthesia Post Evaluation    Patient location during evaluation: PACU  Patient participation: complete - patient participated  Level of consciousness: awake  Pain score: 3  Airway patency: patent  Nausea & Vomiting: no nausea and no vomiting  Complications: no  Cardiovascular status: blood pressure returned to baseline  Respiratory status: acceptable  Hydration status: euvolemic

## 2018-09-04 NOTE — PROGRESS NOTES
Chief Complaint:  Unable to swallow, malnutrition    Subjective: The patient is alert. No problems overnight. Denies chest pain & SOB . Denies abdominal pain. Tolerating diet. No nausea or vomiting. Son at the bedside    Objective:    Vitals:    18 0423   BP: (!) 112/54   Pulse: 63   Resp: 15   Temp: 97.6 °F (36.4 °C)   SpO2: 95%     A&O x's 3, frail, ill appearing  Heart:  RRR, no murmurs, gallops, or rubs. Lungs:  CTA bilaterally, no wheeze, rales or rhonchi  Abd: bowel sounds present, nontender, nondistended, no masses  Extrem:  No clubbing, cyanosis, or edema      Lab Results   Component Value Date     2018    K 4.4 2018     2018    CO2 21 2018    BUN 29 2018    CREATININE 0.5 2018    CALCIUM 8.3 2018      Lab Results   Component Value Date    WBC 7.5 2018    RBC 3.66 2018    HGB 10.9 2018    HCT 34.3 2018    MCV 93.7 2018    MCH 29.8 2018    MCHC 31.8 2018    RDW 14.3 2018     2018    MPV 9.4 2018     PT/INR:    Lab Results   Component Value Date    PROTIME 12.6 2018    INR 1.1 2018     No results for input(s): POCGLU in the last 72 hours. Recent Labs      18   0639  18   0604   NA  129*  135   K  4.8  4.4   CL  100  102   CO2  18*  21*   BUN  30*  29*   CREATININE  0.6  0.5   CALCIUM  8.6  8.3*   GFRAA  >60  >60   LABGLOM  >60  >60   GLUCOSE  103  105       Ct Abdomen Pelvis Wo Contrast    Result Date: 2018  Patient MRN:  [id number] : 1924 Age: 80 years Gender: Female Order Date:  2018 8:15 PM TECHNIQUE/NUMBER OF IMAGES/COMPARISON/CLINICAL HISTORY: CT abdomen and pelvis Axial images with sagittal and coronal reconstructions Abdominal pain. A 80year-old female patient, patient had previous hysterectomy. 264 images FINDINGS: There are motion artifacts in some areas which can obscures fine detail.  This study demonstrates a pattern of severe constipation with fecal rectal rotation and impaction. There is no signs for obstructive uropathy. Exam preserved size for the age group and with preserved cortical thickness. There are normal size and density for the liver, spleen. The pancreas demonstrated unremarkable appearance for the age group. Gallbladder is well distended. The biliary tree and pancreatic duct systems are not dilated. Calcified atheroma changes are seen in the abdominal aorta. Slight fullness of the adrenal glands are seen. No acute inflammatory changes seen in the omental mesenteric fat planes. There is no free intraperitoneal air. Lower lung bases demonstrate no significant findings. Bone structures demonstrate degenerative changes in the lumbar spine L4-5 and L5-S1 level. Patient has a left hip internal fixation. Note is the presence of air in the intraspinal extradural space in the thoracolumbar spine. Some of the areas crossing the neural foramina but does not for retroperitoneal air conspicuous. No conspicuous vaccum phenomena are seen in the disc spaces. Findings for fecal rectal retention/impaction with the constipation. No free intraperitoneal air is observed. Can not identified on acute inflammatory process in the omental/mesenteric fat planes. Presence air in intraspinal extradural space thoracolumbar spine some of them crossing the neural foramina. Etiology is not determined on the present study. Ct Head Wo Contrast    Result Date: 2018  : 1924 Age: 80 years Gender: Female Order Date:  2018 8:15 PM EXAM: CT HEAD WO CONTRAST COMPARISON: None INDICATION:  dysphagia  FINDINGS: Area of encephalomalacic changes seen involving posterior left frontal cortex and subcortical white matter. No acute intracranial hemorrhage or edema. No abnormal extra-axial fluid collections. There is generalized parenchymal volume loss appearing age appropriate.      1. No findings to suggest acute intracranial edema or Dehydration [E86.0]    Protein-calorie malnutrition ([facility] [address] [E46]    Hyponatremia [E87.1]    Lactic acidosis [E87.2]    Hypertension [I10]    Thyroid disease [E07.9]    Dysphagia [R13.10]       Plan:  Swallowing pills and diet today             Nutritional eval reviewed--severely malnourished             Oral intake improved but still not adequate             MBS findings noted             Lab reviewed             Continue IVF and electrolyte replacement             PEG today        Espinoza Ding DO  7:45 AM  9/4/2018

## 2018-09-04 NOTE — ANESTHESIA PRE PROCEDURE
Department of Anesthesiology  Preprocedure Note       Name:  Tala Colmenares   Age:  80 y.o.  :  1924                                          MRN:  [id number]         Date:  2018      Surgeon: Turner Barthel):  Dio Grimm MD    Procedure: Procedure(s):  EGD PEG-DR KEY 2PM    Medications prior to admission:   Prior to Admission medications    Medication Sig Start Date End Date Taking? Authorizing Provider   atorvastatin (LIPITOR) 20 MG tablet Take 20 mg by mouth daily   Yes Historical Provider, MD   calcium carbonate (OSCAL) 500 MG TABS tablet Take 500 mg by mouth 2 times daily   Yes Historical Provider, MD   dronabinol (MARINOL) 5 MG capsule Take 5 mg by mouth 2 times daily (before meals). .   Yes Historical Provider, MD   metoprolol succinate (TOPROL XL) 25 MG extended release tablet Take 12.5 mg by mouth daily   Yes Historical Provider, MD   mirtazapine (REMERON) 30 MG tablet Take 30 mg by mouth nightly   Yes Historical Provider, MD   oxyCODONE-acetaminophen (PERCOCET) 5-325 MG per tablet Take 1 tablet by mouth 3 times daily as needed for Pain. .   Yes Historical Provider, MD   levothyroxine (SYNTHROID) 50 MCG tablet Take 50 mcg by mouth Daily   Yes Historical Provider, MD   telmisartan (MICARDIS) 40 MG tablet Take 40 mg by mouth daily   Yes Historical Provider, MD   aspirin 81 MG tablet Take 81 mg by mouth daily   Yes Historical Provider, MD   Famotidine-Ca Carb-Mag Hydrox (PEPCID COMPLETE PO) Take by mouth as needed     Historical Provider, MD   Cholecalciferol (VITAMIN D-3) 1000 UNITS CAPS Take 2,000 Units by mouth daily    Historical Provider, MD   pravastatin (PRAVACHOL) 20 MG tablet Take 20 mg by mouth daily    Historical Provider, MD   ibuprofen (ADVIL;MOTRIN) 800 MG tablet Take 800 mg by mouth every 6 hours as needed for Pain    Historical Provider, MD       Current medications:    Current Facility-Administered Medications   Medication Dose Route Frequency Provider Last Rate Last Dose   

## 2018-09-05 LAB
ANION GAP SERPL CALCULATED.3IONS-SCNC: 15 MMOL/L (ref 7–16)
BUN BLDV-MCNC: 16 MG/DL (ref 8–23)
CALCIUM SERPL-MCNC: 7.8 MG/DL (ref 8.6–10.2)
CHLORIDE BLD-SCNC: 103 MMOL/L (ref 98–107)
CO2: 19 MMOL/L (ref 22–29)
CREAT SERPL-MCNC: 0.5 MG/DL (ref 0.5–1)
GFR AFRICAN AMERICAN: >60
GFR NON-AFRICAN AMERICAN: >60 ML/MIN/1.73
GLUCOSE BLD-MCNC: 192 MG/DL (ref 74–109)
POTASSIUM SERPL-SCNC: 4.1 MMOL/L (ref 3.5–5)
SODIUM BLD-SCNC: 137 MMOL/L (ref 132–146)

## 2018-09-05 PROCEDURE — 97162 PT EVAL MOD COMPLEX 30 MIN: CPT | Performed by: PHYSICAL THERAPIST

## 2018-09-05 PROCEDURE — G8982 BODY POS GOAL STATUS: HCPCS | Performed by: PHYSICAL THERAPIST

## 2018-09-05 PROCEDURE — G8981 BODY POS CURRENT STATUS: HCPCS | Performed by: PHYSICAL THERAPIST

## 2018-09-05 PROCEDURE — 97166 OT EVAL MOD COMPLEX 45 MIN: CPT

## 2018-09-05 PROCEDURE — G8987 SELF CARE CURRENT STATUS: HCPCS

## 2018-09-05 PROCEDURE — 36415 COLL VENOUS BLD VENIPUNCTURE: CPT

## 2018-09-05 PROCEDURE — 6370000000 HC RX 637 (ALT 250 FOR IP): Performed by: INTERNAL MEDICINE

## 2018-09-05 PROCEDURE — 2580000003 HC RX 258: Performed by: INTERNAL MEDICINE

## 2018-09-05 PROCEDURE — 6360000002 HC RX W HCPCS: Performed by: INTERNAL MEDICINE

## 2018-09-05 PROCEDURE — 80048 BASIC METABOLIC PNL TOTAL CA: CPT

## 2018-09-05 PROCEDURE — G8988 SELF CARE GOAL STATUS: HCPCS

## 2018-09-05 PROCEDURE — 1200000000 HC SEMI PRIVATE

## 2018-09-05 RX ORDER — MORPHINE SULFATE 2 MG/ML
0.5 INJECTION, SOLUTION INTRAMUSCULAR; INTRAVENOUS
Status: DISCONTINUED | OUTPATIENT
Start: 2018-09-05 | End: 2018-09-06 | Stop reason: HOSPADM

## 2018-09-05 RX ORDER — MORPHINE SULFATE 2 MG/ML
1.5 INJECTION, SOLUTION INTRAMUSCULAR; INTRAVENOUS
Status: DISCONTINUED | OUTPATIENT
Start: 2018-09-05 | End: 2018-09-05

## 2018-09-05 RX ADMIN — ASPIRIN 81 MG: 81 TABLET ORAL at 09:30

## 2018-09-05 RX ADMIN — Medication 10 ML: at 20:00

## 2018-09-05 RX ADMIN — MORPHINE SULFATE 0.5 MG: 2 INJECTION, SOLUTION INTRAMUSCULAR; INTRAVENOUS at 09:27

## 2018-09-05 RX ADMIN — MORPHINE SULFATE 0.5 MG: 2 INJECTION, SOLUTION INTRAMUSCULAR; INTRAVENOUS at 05:08

## 2018-09-05 RX ADMIN — MORPHINE SULFATE 0.5 MG: 2 INJECTION, SOLUTION INTRAMUSCULAR; INTRAVENOUS at 19:40

## 2018-09-05 RX ADMIN — MORPHINE SULFATE 0.5 MG: 2 INJECTION, SOLUTION INTRAMUSCULAR; INTRAVENOUS at 07:17

## 2018-09-05 RX ADMIN — MORPHINE SULFATE 0.5 MG: 2 INJECTION, SOLUTION INTRAMUSCULAR; INTRAVENOUS at 00:51

## 2018-09-05 RX ADMIN — LEVOTHYROXINE SODIUM 50 MCG: 50 TABLET ORAL at 09:30

## 2018-09-05 RX ADMIN — ENOXAPARIN SODIUM 30 MG: 100 INJECTION SUBCUTANEOUS at 09:30

## 2018-09-05 RX ADMIN — METOPROLOL SUCCINATE 12.5 MG: 25 TABLET, FILM COATED, EXTENDED RELEASE ORAL at 09:30

## 2018-09-05 ASSESSMENT — PAIN DESCRIPTION - PROGRESSION: CLINICAL_PROGRESSION: GRADUALLY WORSENING

## 2018-09-05 ASSESSMENT — PAIN DESCRIPTION - ONSET: ONSET: ON-GOING

## 2018-09-05 ASSESSMENT — PAIN DESCRIPTION - ORIENTATION: ORIENTATION: MID

## 2018-09-05 ASSESSMENT — PAIN SCALES - GENERAL
PAINLEVEL_OUTOF10: 10
PAINLEVEL_OUTOF10: 6
PAINLEVEL_OUTOF10: 9
PAINLEVEL_OUTOF10: 10
PAINLEVEL_OUTOF10: 8
PAINLEVEL_OUTOF10: 0

## 2018-09-05 ASSESSMENT — PAIN DESCRIPTION - DESCRIPTORS: DESCRIPTORS: ACHING;CONSTANT;DISCOMFORT;TENDER

## 2018-09-05 ASSESSMENT — PAIN DESCRIPTION - PAIN TYPE: TYPE: SURGICAL PAIN

## 2018-09-05 ASSESSMENT — PAIN DESCRIPTION - LOCATION: LOCATION: ABDOMEN

## 2018-09-05 ASSESSMENT — PAIN DESCRIPTION - FREQUENCY: FREQUENCY: CONTINUOUS

## 2018-09-05 NOTE — PROGRESS NOTES
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    Poor Problem solving skills  poor Memory   poor Sequencing   Poor safety    Sensory:   Hearing: wfl  Vision: wfl    Glasses: yes [] no [] reading []      UE Assessment:  Hand Dominance: Right []  Left []     Strength ROM Additional Info:    RUE  distally 3-/5 Shoulder limited (contracture)  Elbow  Fair-  and Poor+ FMC/dexterity noted during ADL tasks     LUE Proximally 2/5  Distally 3/5 Shoulder limited  Elbow wfl Fair  and Fair FMC/dexterity noted during ADL tasks     Sensation:wfl  Tone: R UE / LE spasticity   Edema:none noted     Functional Assessment:   Initial Eval  Comments   Feeding  Peg tube    Grooming  Max A    Upper Body Dressing dependent     Lower Body Dressing dependent    Bathing dependent    Toileting  dependent    Bed Mobility  Rolling: max A  Supine to Sit: NT  Sit to Supine: NT Pt declining supine to sit this date due to severe abdominal pain   Functional Transfers NT     Functional Mobility NT      Sit balance: NT  Stand balance: NT  Endurance/Activity tolerance: Poor Comments: Upon arrival pt supine in bed and agreeable to OT Session - son present throughout evaluation. At end of session pt semi-supine in bed with all devices within reach, all lines intact    Assessment of current deficits   Functional mobility [x]  ROM [] Strength [x]  Cognition []  ADLs [x]   IADLs [x] Safety Awareness [x] Endurance [x]  Fine Motor Coordination [] Balance [x] Vision/perception [] Sensation []   Gross Motor Coordination []     Eval Complexity: mod  Profile and History- med (extensive chart review)  Assessment of Occupational Performance and Identification of Deficits- med  Clinical Decision Making- med     Treatment frequency: prn     Plan of Care:  ADL retraining [x]   Equipment needs []   Neuromuscular re-education [] Energy Conservation Techniques [x]  Functional Transfer training [x] Patient and/or Family Education [x]  Functional Mobility training [x]  Environmental Modifications []  Cognitive re-training []   Compensatory techniques for ADLs [x]  Splinting Needs []   Positioning to improve overall function []  Other: []      Rehab Potential: good    Patient / Family Goal: return home    Short term goals  Time Frame: 3-5 days  STG 1 :  Pt will complete basic grooming task with mod A  STG 2 : Pt will complete UB self-care tasks with max A  STG 3 :  Pt will complete B rolling in bed with mod A impacting self-care tasks   STG 4 :  Pt will demonstrate P+ activity tolerance while completing ADL task. Pt participated in establishment of the following goals    Patient and/or family understands diagnosis, prognosis and plan of care: yes    Pt educated on safety and OT POC. Patient  verbalized poor understanding of education, requires additional education       [] Malnutrition indicators have been identified and nursing has been notified to ensure a dietitian consult is ordered.      AM-PAC Daily Activity Inpatient   How much help for putting on and taking off regular lower body clothing?: Total  How much help for Bathing?: Total  How much help for Toileting?: Total  How much help for putting on and taking off regular upper body clothing?: Total  How much help for taking care of personal grooming?: A Lot  How much help for eating meals?: Total  AM-PAC Inpatient Daily Activity Raw Score: 7  AM-PAC Inpatient ADL T-Scale Score : 20.13  ADL Inpatient CMS 0-100% Score: 92.44  ADL Inpatient CMS G-Code Modifier : CM     mod Evaluation completed   Time in: 1020  Time out: Ingris OTR/L #[id number]

## 2018-09-05 NOTE — PLAN OF CARE
Problem: Falls - Risk of:  Goal: Will remain free from falls  Will remain free from falls    Outcome: Met This Shift      Problem: Nutrition Deficit:  Goal: Ability to achieve adequate nutritional intake will improve  Ability to achieve adequate nutritional intake will improve   Outcome: Met This Shift      Problem: Pain:  Goal: Control of acute pain  Control of acute pain   Outcome: Met This Shift

## 2018-09-05 NOTE — CARE COORDINATION
Social Work/Discharge Planning    SW spoke with pt's son regarding discharge plan and transition of care. He stated that he does not want pt returning to [facility] and prefers [facility]. Referral made, awaiting acceptance.     Electronically signed by Kala Madden on 9/5/2018 at 2:58 PM

## 2018-09-05 NOTE — PROGRESS NOTES
which can obscures fine detail. This study demonstrates a pattern of severe constipation with fecal rectal rotation and impaction. There is no signs for obstructive uropathy. Exam preserved size for the age group and with preserved cortical thickness. There are normal size and density for the liver, spleen. The pancreas demonstrated unremarkable appearance for the age group. Gallbladder is well distended. The biliary tree and pancreatic duct systems are not dilated. Calcified atheroma changes are seen in the abdominal aorta. Slight fullness of the adrenal glands are seen. No acute inflammatory changes seen in the omental mesenteric fat planes. There is no free intraperitoneal air. Lower lung bases demonstrate no significant findings. Bone structures demonstrate degenerative changes in the lumbar spine L4-5 and L5-S1 level. Patient has a left hip internal fixation. Note is the presence of air in the intraspinal extradural space in the thoracolumbar spine. Some of the areas crossing the neural foramina but does not for retroperitoneal air conspicuous. No conspicuous vaccum phenomena are seen in the disc spaces. Findings for fecal rectal retention/impaction with the constipation. No free intraperitoneal air is observed. Can not identified on acute inflammatory process in the omental/mesenteric fat planes. Presence air in intraspinal extradural space thoracolumbar spine some of them crossing the neural foramina. Etiology is not determined on the present study. Ct Head Wo Contrast    Result Date: 2018  : 1924 Age: 80 years Gender: Female Order Date:  2018 8:15 PM EXAM: CT HEAD WO CONTRAST COMPARISON: None INDICATION:  dysphagia  FINDINGS: Area of encephalomalacic changes seen involving posterior left frontal cortex and subcortical white matter. No acute intracranial hemorrhage or edema. No abnormal extra-axial fluid collections.  There is generalized parenchymal volume loss appearing age appropriate. 1. No findings to suggest acute intracranial edema or hemorrhage. 2. Area of remote/old stroke involving posterior left frontal lobe. Xr Chest Portable    Result Date: 2018  Patient MRN:  [id number] : 1924 Age: 80 years Gender: Female Order Date:  2018 8:15 PM TECHNIQUE/NUMBER OF IMAGES/COMPARISON/CLINICAL HISTORY: Chest AP 1 image one view HISTORY: Cough FINDINGS: Lungs are clear, no infiltrates, consolidations or pleural effusions are seen. Heart has a size, mediastinum appears unremarkable. There is no perihilar vascular congestion     No acute cardiopulmonary process    Fluoroscopy Modified Barium Swallow With Video    Result Date: 2018  Patient MRN:  [id number] : 1924 Age: 80 years Gender: Female Order Date:  2018 2:00 AM EXAM: FL MODIFIED BARIUM SWALLOW W VIDEO COMPARISON: None INDICATION:  dysphagia dysphagia FINDINGS: Fluoroscopy time: 1 minute. Images: 1. No evidence of aspiration or penetration. There is a oral delay. There is retention in valleculae and piriform sinuses. No pharyngeal delay. Laryngeal elevation is adequate. No evidence of aspiration or penetration. Scheduled Meds:   aspirin  81 mg Oral Daily    levothyroxine  50 mcg Oral Daily    metoprolol succinate  12.5 mg Oral Daily    sodium chloride flush  10 mL Intravenous 2 times per day    enoxaparin  30 mg Subcutaneous Daily     Continuous Infusions:    PRN Meds:.morphine, mineral oil-hydrophilic petrolatum, sodium chloride flush, magnesium hydroxide, ondansetron, sodium chloride flush, acetaminophen    I/O last 3 completed shifts: In: 702 [I.V.:702]  Out: -   No intake/output data recorded.     Intake/Output Summary (Last 24 hours) at 18 1011  Last data filed at 18 1351   Gross per 24 hour   Intake              702 ml   Output                0 ml   Net              702 ml       Assessment:    Active Hospital Problems    Diagnosis    Dehydration [E86.0]    Protein-calorie malnutrition ([facility] [address] [E46]    Hyponatremia [E87.1]    Lactic acidosis [E87.2]    Hypertension [I10]    Thyroid disease [E07.9]    Dysphagia [R13.10]       Plan:  Swallowing pills and diet today             Nutritional eval reviewed--severely malnourished             Oral intake improved but still not adequate             MBS findings noted             Lab reviewed--BCL nl             discontinue IVF               PEG placed with TF started             MS for post op pain             Discharge 24 hrs    Mignon Dub 37, DO  10:11 AM  9/5/2018

## 2018-09-05 NOTE — PROGRESS NOTES
[facility]  Physical Therapy Initial Evaluation    Name: Hayden Urias  : 1924  MRN: [id number]    Date of Service: 2018    Evaluating Therapist: Jazmín William PT, DPT       Equipment Recommendations: to be determined. Room #: 9337/5445-E  DIAGNOSIS: malnutrition/dysphagia  PRECAUTIONS: fall risk, bed alarm     Social:  Pt from [facility]  Prior to admission pt transfers with [facility]. Initial Evaluation  Date:  Treatment      Short Term/ Long Term   Goals   Was pt agreeable to Eval/treatment? yes     Does pt have pain? Severe stomach pain. Bed Mobility  Rolling: max A   Supine to sit: NT  Sit to supine: NT  Scooting: dep   Mod A    Transfers Sit to stand: NT  Stand to sit: NT  Stand pivot: NT  NT   Ambulation    NT  NT   Stair negotiation: ascended and descended  NT  NT   AM-PAC Raw Score        Pt is alert and Oriented x2 with cues. ROM:  L LE grossly WFL  R LE limited throughout,  Min knee flexion contracture   Strength:   L LE grossly 3/5  R LE hip 1/5, knee 2-/5, ankle 2-/5  Balance: sitting NT  Sensation: no c/o numbness/tingling  Endurance: poor       ASSESSMENT  Pt displays functional ability as noted in the objective portion of this evaluation. Comments/Treatment:  Pt found laying in bed agreeable to evaluation with encouragement. Pt limited by pain. Pt reports recently medicated. Pt refused to attempt supine to sit at this time. Pt left laying in bed with call button in reach and bed alarm reapplied. Patient education  Pt educated on mobility. Patient response to education:   Pt verbalized understanding Pt demonstrated skill Pt requires further education in this area   x  x     Rehab potential is Good for reaching above PT goals. Pts/ family goals   1. None stated. Patient and or family understand(s) diagnosis, prognosis, and plan of care. PLAN  PT care will be provided in accordance with the objectives noted above.   Whenever appropriate,

## 2018-09-05 NOTE — PROGRESS NOTES
GENERAL SURGERY  DAILY PROGRESS NOTE  9/5/2018    Subjective:  Vickey Gutiérrez. Patient resting comfortably in bed.     Objective:  /73   Pulse 75   Temp 97.8 °F (36.6 °C) (Temporal)   Resp 18   Ht 4' 11\" (1.499 m)   Wt 83 lb 8 oz (37.9 kg)   SpO2 97%   BMI 16.86 kg/m²     GENERAL:  Laying in bed, alert, cooperative, no apparent distress  HEAD: Normocephalic, atraumatic  EYES: No sclera icterus, pupils equal  LUNGS:  No increased work of breathing  CARDIOVASCULAR:  RR  ABDOMEN:  Soft, non-tender, non-distended, PEG freely mobile, 3 cm at skin  EXTREMITIES: No edema or swelling  SKIN: Warm and dry    Assessment/Plan:  80 y.o. female s/p PEG placement for dysphagia    Continue TF per dietary recommendations  Routine PEG care, Follow-up with Dr. Ceci Fregoso    Electronically signed by Mark Reyes MD on 9/5/2018 at 6:41 AM

## 2018-09-05 NOTE — OP NOTE
[address]                   [address] [facility], [address] [place] [address]                                 OPERATIVE REPORT    PATIENT NAME: Rhea Melton                      :        1924  MED REC NO:   [id number]                            ROOM:       8421  ACCOUNT NO:   [de-identified]                           ADMIT DATE: 2018  PROVIDER:     Jony Lancaster MD    DATE OF PROCEDURE:  2018    PREOPERATIVE DIAGNOSES:  Severe malnutrition, dysphagia, pharyngeal  dysfunction. POSTOPERATIVE DIAGNOSES:  Severe malnutrition, dysphagia, pharyngeal  dysfunction, chronic gastritis, and hiatal hernia. PROCEDURE:  Esophagogastroduodenoscopy, percutaneous endoscopic gastrostomy  tube placement. SURGEON:  Jony Lancaster MD    ANESTHESIA:  LMAC. COMPLICATIONS:  None. ESTIMATED BLOOD LOSS:  None. FINDINGS:  Z-line was seen at 36 cm, several small centimeter hiatal hernia  was noted without evidence of erosion or ulceration. Mild chronic  gastritis is noted. A 20-Danish pull-through PEG tube was placed into the  fundus of the stomach without complications under direct vision and a  second-look endoscopy demonstrated no evidence of active bleeding. The PEG  tube was fashioned and a binder was placed, may use PEG tube for medication  and/or tube feeds. INDICATIONS:  Severe malnutrition, weight loss, dysphagia, pharyngeal  dysfunction without evidence of aspiration, but prolonged laryngeal and  pharyngeal phases. RECOMMENDATION:  PEG tube risks, benefits and options were reviewed with  the patient and the family who agreed to proceed. DESCRIPTION OF PROCEDURE:  The patient was identified. Informed consent  was obtained as the risks and benefits of the procedure were reviewed with  the patient and family who agreed to proceed. The patient was brought into  the endoscopy suite.   An IV was established and the above anesthesia was  administered as well as Cetacaine spray to the oropharynx. The adult video esophagoscope was inserted into the proximal esophagus  without complications. The scope then traversed into the distal esophagus  and down into the stomach, which was insufflated with air. The antrum was  photographed and visualized. The pylorus was visualized. After  identification, it was intubated. Visualization of the C-loop of the  duodenum was performed without complications. Multiple photographs were  taken. The scope was then brought back into the stomach. On the anterior wall,  the light was visualized. The area was prepped and draped in a sterile  fashion. A 5-mL wheal of lidocaine was created over the area. A small  nick was made with a 15 blade. A needle was inserted directly into the  stomach without complication. Using a loop through the scope, the needle  was grasped. A guidewire was passed through the needle and subsequently  grasped with the loop snare. The snare was then brought out through the  oropharynx. In the usual fashion the PEG tube was secured to the guidewire  and in pull-through fashion was brought through the oropharynx, esophagus,  stomach, and out the anterior abdominal wall. The PEG tube was in proper  position and matured. The scope was reinserted without complication, and  visualization demonstrated no evidence of bleeding. The patient tolerated the procedure well and was transferred to the  recovery room area in stable condition, with stable vital signs.         Eliseo Olmedo MD    D: 09/04/2018 14:20:35       T: 09/04/2018 15:12:30     [email or web address]  Job#: [id number]     Doc#: [id number]    CC:  Md Brian Garcia Gino Rogers, MD

## 2018-09-06 VITALS
HEART RATE: 76 BPM | BODY MASS INDEX: 16.83 KG/M2 | SYSTOLIC BLOOD PRESSURE: 98 MMHG | RESPIRATION RATE: 20 BRPM | DIASTOLIC BLOOD PRESSURE: 64 MMHG | HEIGHT: 59 IN | WEIGHT: 83.5 LBS | OXYGEN SATURATION: 96 % | TEMPERATURE: 99.1 F

## 2018-09-06 LAB
ANION GAP SERPL CALCULATED.3IONS-SCNC: 11 MMOL/L (ref 7–16)
BUN BLDV-MCNC: 13 MG/DL (ref 8–23)
CALCIUM SERPL-MCNC: 8.1 MG/DL (ref 8.6–10.2)
CHLORIDE BLD-SCNC: 100 MMOL/L (ref 98–107)
CO2: 22 MMOL/L (ref 22–29)
CREAT SERPL-MCNC: 0.4 MG/DL (ref 0.5–1)
GFR AFRICAN AMERICAN: >60
GFR NON-AFRICAN AMERICAN: >60 ML/MIN/1.73
GLUCOSE BLD-MCNC: 125 MG/DL (ref 74–109)
POTASSIUM SERPL-SCNC: 4.3 MMOL/L (ref 3.5–5)
SODIUM BLD-SCNC: 133 MMOL/L (ref 132–146)

## 2018-09-06 PROCEDURE — 2580000003 HC RX 258: Performed by: INTERNAL MEDICINE

## 2018-09-06 PROCEDURE — 80048 BASIC METABOLIC PNL TOTAL CA: CPT

## 2018-09-06 PROCEDURE — 6370000000 HC RX 637 (ALT 250 FOR IP): Performed by: INTERNAL MEDICINE

## 2018-09-06 PROCEDURE — 36415 COLL VENOUS BLD VENIPUNCTURE: CPT

## 2018-09-06 PROCEDURE — 6360000002 HC RX W HCPCS: Performed by: INTERNAL MEDICINE

## 2018-09-06 RX ORDER — PETROLATUM 42 G/100G
OINTMENT TOPICAL
Refills: 0 | DISCHARGE
Start: 2018-09-06

## 2018-09-06 RX ADMIN — LEVOTHYROXINE SODIUM 50 MCG: 50 TABLET ORAL at 06:27

## 2018-09-06 RX ADMIN — MORPHINE SULFATE 0.5 MG: 2 INJECTION, SOLUTION INTRAMUSCULAR; INTRAVENOUS at 04:51

## 2018-09-06 RX ADMIN — ENOXAPARIN SODIUM 30 MG: 100 INJECTION SUBCUTANEOUS at 11:41

## 2018-09-06 RX ADMIN — ASPIRIN 81 MG: 81 TABLET ORAL at 11:41

## 2018-09-06 RX ADMIN — MORPHINE SULFATE 0.5 MG: 2 INJECTION, SOLUTION INTRAMUSCULAR; INTRAVENOUS at 01:36

## 2018-09-06 RX ADMIN — Medication 10 ML: at 11:42

## 2018-09-06 ASSESSMENT — PAIN DESCRIPTION - ORIENTATION: ORIENTATION: LEFT;LOWER

## 2018-09-06 ASSESSMENT — PAIN DESCRIPTION - ONSET: ONSET: ON-GOING

## 2018-09-06 ASSESSMENT — PAIN SCALES - GENERAL
PAINLEVEL_OUTOF10: 0
PAINLEVEL_OUTOF10: 7
PAINLEVEL_OUTOF10: 7

## 2018-09-06 ASSESSMENT — PAIN DESCRIPTION - PROGRESSION: CLINICAL_PROGRESSION: NOT CHANGED

## 2018-09-06 ASSESSMENT — PAIN DESCRIPTION - DESCRIPTORS: DESCRIPTORS: CONSTANT;DISCOMFORT

## 2018-09-06 ASSESSMENT — PAIN DESCRIPTION - PAIN TYPE: TYPE: ACUTE PAIN

## 2018-09-06 ASSESSMENT — PAIN DESCRIPTION - FREQUENCY: FREQUENCY: INTERMITTENT

## 2018-09-06 ASSESSMENT — PAIN DESCRIPTION - LOCATION: LOCATION: ABDOMEN

## 2018-09-06 NOTE — CARE COORDINATION
Social Work/Discharge [facility] can accept pt and is meeting with pt's son today to discuss finances and medicaid. Rm Muro from [facility] aware that pt is discharged. She stated that if pt's son meets with them today and provides financial information, they will be able to admit today. SW following.     Electronically signed by Debo Bethea on 9/6/2018 at 10:12 AM

## 2018-09-06 NOTE — CARE COORDINATION
Social Work/Discharge Planning    SW spoke with pt's son regarding discharge plan. He stated that he would like pt to return to [facility]. Cydney Villanueva aware. Pt will be discharging to [facility] today at [address] [place] [address] via [facility] ambulance. RN aware, liaison aware, pt's son aware.      Electronically signed by Caro Garcia on 9/6/2018 at 12:03 PM

## 2018-09-06 NOTE — PROGRESS NOTES
Dehydration [E86.0]    Protein-calorie malnutrition ([address] [E46]    Hyponatremia [E87.1]    Lactic acidosis [E87.2]    Hypertension [I10]    Thyroid disease [E07.9]    Dysphagia [R13.10]       Plan:  Swallowing pills and diet today             Nutritional eval reviewed--severely malnourished             Oral intake improved but still not adequate             MBS findings noted             Lab reviewed--BCL nl             Off IVF               PEG placed with TF started             No post op pain             Discharge today  Mignon Dub 37, DO  9:16 AM  9/6/2018

## 2018-09-06 NOTE — DISCHARGE SUMMARY
09/06/2018    GLUCOSE 125 09/06/2018    PROT 7.3 08/31/2018    LABALBU 3.5 08/31/2018    CALCIUM 8.1 09/06/2018    BILITOT 0.3 08/31/2018    ALKPHOS 92 08/31/2018    AST 37 08/31/2018    ALT 24 08/31/2018     BMP:    Lab Results   Component Value Date     09/06/2018    K 4.3 09/06/2018     09/06/2018    CO2 22 09/06/2018    BUN 13 09/06/2018    LABALBU 3.5 08/31/2018    CREATININE 0.4 09/06/2018    CALCIUM 8.1 09/06/2018    GFRAA >60 09/06/2018    LABGLOM >60 09/06/2018    GLUCOSE 125 09/06/2018       Disposition: [facility]    Patient Instructions:     Medication List      START taking these medications    mineral oil-hydrophilic petrolatum ointment  Apply topically as needed.         CONTINUE taking these medications    aspirin 81 MG tablet     atorvastatin 20 MG tablet  Commonly known as:  LIPITOR     calcium carbonate 500 MG Tabs tablet  Commonly known as:  OSCAL     levothyroxine 50 MCG tablet  Commonly known as:  SYNTHROID     metoprolol succinate 25 MG extended release tablet  Commonly known as:  TOPROL XL     oxyCODONE-acetaminophen 5-325 MG per tablet  Commonly known as:  PERCOCET     PEPCID COMPLETE PO     Vitamin D-3 1000 units Caps        STOP taking these medications    dronabinol 5 MG capsule  Commonly known as:  MARINOL     ibuprofen 800 MG tablet  Commonly known as:  ADVIL;MOTRIN     mirtazapine 30 MG tablet  Commonly known as:  REMERON     pravastatin 20 MG tablet  Commonly known as:  PRAVACHOL     telmisartan 40 MG tablet  Commonly known as:  MICARDIS           Where to Get Your Medications      Information about where to get these medications is not yet available    Ask your nurse or doctor about these medications  · mineral oil-hydrophilic petrolatum ointment          Activity: activity as tolerated   Diet: regular diet--dysphagia    Follow-up with  in [facility]    Note that over 30 minutes was spent in preparing discharge papers, discussing discharge with patient, medication review, etc.    Signed:  Lazaro Ding DO  9/6/2018  9:21 AM

## 2018-09-06 NOTE — PLAN OF CARE
Problem: ABCDS Injury Assessment  Goal: Absence of physical injury  Outcome: Met This Shift      Problem: Falls - Risk of:  Goal: Will remain free from falls  Will remain free from falls    Outcome: Met This Shift    Goal: Absence of physical injury  Absence of physical injury    Outcome: Met This Shift      Problem: Airway Clearance - Ineffective:  Goal: Ability to maintain a clear airway will improve  Ability to maintain a clear airway will improve   Outcome: Met This Shift      Problem: Nutrition Deficit:  Goal: Ability to achieve adequate nutritional intake will improve  Ability to achieve adequate nutritional intake will improve   Outcome: Met This Shift

## 2018-10-01 PROBLEM — E86.0 DEHYDRATION: Status: RESOLVED | Noted: 2018-09-01 | Resolved: 2018-10-01

## 2020-05-21 ENCOUNTER — HOSPITAL ENCOUNTER (OUTPATIENT)
Age: 85
Discharge: HOME | End: 2020-05-23
Payer: COMMERCIAL

## 2020-05-21 PROCEDURE — U0003 INFECTIOUS AGENT DETECTION BY NUCLEIC ACID (DNA OR RNA); SEVERE ACUTE RESPIRATORY SYNDROME CORONAVIRUS 2 (SARS-COV-2) (CORONAVIRUS DISEASE [COVID-19]), AMPLIFIED PROBE TECHNIQUE, MAKING USE OF HIGH THROUGHPUT TECHNOLOGIES AS DESCRIBED BY CMS-2020-01-R: HCPCS

## 2020-05-24 LAB
SARS-COV-2: NOT DETECTED
SOURCE: NORMAL

## 2020-06-05 ENCOUNTER — HOSPITAL ENCOUNTER (OUTPATIENT)
Age: 85
Discharge: HOME | End: 2020-06-07
Payer: COMMERCIAL

## 2020-06-05 PROCEDURE — U0003 INFECTIOUS AGENT DETECTION BY NUCLEIC ACID (DNA OR RNA); SEVERE ACUTE RESPIRATORY SYNDROME CORONAVIRUS 2 (SARS-COV-2) (CORONAVIRUS DISEASE [COVID-19]), AMPLIFIED PROBE TECHNIQUE, MAKING USE OF HIGH THROUGHPUT TECHNOLOGIES AS DESCRIBED BY CMS-2020-01-R: HCPCS

## 2020-06-07 LAB
SARS-COV-2: NOT DETECTED
SOURCE: NORMAL

## 2020-06-18 ENCOUNTER — HOSPITAL ENCOUNTER (OUTPATIENT)
Age: 85
Discharge: HOME | End: 2020-06-20
Payer: COMMERCIAL

## 2020-06-18 PROCEDURE — U0003 INFECTIOUS AGENT DETECTION BY NUCLEIC ACID (DNA OR RNA); SEVERE ACUTE RESPIRATORY SYNDROME CORONAVIRUS 2 (SARS-COV-2) (CORONAVIRUS DISEASE [COVID-19]), AMPLIFIED PROBE TECHNIQUE, MAKING USE OF HIGH THROUGHPUT TECHNOLOGIES AS DESCRIBED BY CMS-2020-01-R: HCPCS

## 2020-06-20 LAB
SARS-COV-2: NOT DETECTED
SOURCE: NORMAL

## 2020-07-07 ENCOUNTER — HOSPITAL ENCOUNTER (OUTPATIENT)
Age: 85
Discharge: HOME | End: 2020-07-09
Payer: COMMERCIAL

## 2020-07-07 PROCEDURE — U0003 INFECTIOUS AGENT DETECTION BY NUCLEIC ACID (DNA OR RNA); SEVERE ACUTE RESPIRATORY SYNDROME CORONAVIRUS 2 (SARS-COV-2) (CORONAVIRUS DISEASE [COVID-19]), AMPLIFIED PROBE TECHNIQUE, MAKING USE OF HIGH THROUGHPUT TECHNOLOGIES AS DESCRIBED BY CMS-2020-01-R: HCPCS

## 2020-07-09 LAB
SARS-COV-2: DETECTED
SOURCE: ABNORMAL

## 2020-07-30 ENCOUNTER — HOSPITAL ENCOUNTER (OUTPATIENT)
Age: 85
Discharge: HOME | End: 2020-08-01
Payer: COMMERCIAL

## 2020-07-30 PROCEDURE — U0003 INFECTIOUS AGENT DETECTION BY NUCLEIC ACID (DNA OR RNA); SEVERE ACUTE RESPIRATORY SYNDROME CORONAVIRUS 2 (SARS-COV-2) (CORONAVIRUS DISEASE [COVID-19]), AMPLIFIED PROBE TECHNIQUE, MAKING USE OF HIGH THROUGHPUT TECHNOLOGIES AS DESCRIBED BY CMS-2020-01-R: HCPCS

## 2020-08-01 LAB
SARS-COV-2: NOT DETECTED
SOURCE: NORMAL

## 2020-08-03 ENCOUNTER — HOSPITAL ENCOUNTER (OUTPATIENT)
Age: 85
Discharge: HOME | End: 2020-08-05
Payer: COMMERCIAL

## 2020-08-03 PROCEDURE — U0003 INFECTIOUS AGENT DETECTION BY NUCLEIC ACID (DNA OR RNA); SEVERE ACUTE RESPIRATORY SYNDROME CORONAVIRUS 2 (SARS-COV-2) (CORONAVIRUS DISEASE [COVID-19]), AMPLIFIED PROBE TECHNIQUE, MAKING USE OF HIGH THROUGHPUT TECHNOLOGIES AS DESCRIBED BY CMS-2020-01-R: HCPCS

## 2020-08-05 LAB
SARS-COV-2: NOT DETECTED
SOURCE: NORMAL

## 2020-10-19 ENCOUNTER — HOSPITAL ENCOUNTER (OUTPATIENT)
Age: 85
Discharge: HOME | End: 2020-10-21
Payer: COMMERCIAL

## 2020-10-19 PROCEDURE — U0003 INFECTIOUS AGENT DETECTION BY NUCLEIC ACID (DNA OR RNA); SEVERE ACUTE RESPIRATORY SYNDROME CORONAVIRUS 2 (SARS-COV-2) (CORONAVIRUS DISEASE [COVID-19]), AMPLIFIED PROBE TECHNIQUE, MAKING USE OF HIGH THROUGHPUT TECHNOLOGIES AS DESCRIBED BY CMS-2020-01-R: HCPCS

## 2020-10-21 LAB
SARS-COV-2: NOT DETECTED
SOURCE: NORMAL

## 2020-10-25 ENCOUNTER — HOSPITAL ENCOUNTER (OUTPATIENT)
Age: 85
Discharge: HOME | End: 2020-10-27
Payer: COMMERCIAL

## 2020-10-26 PROCEDURE — U0003 INFECTIOUS AGENT DETECTION BY NUCLEIC ACID (DNA OR RNA); SEVERE ACUTE RESPIRATORY SYNDROME CORONAVIRUS 2 (SARS-COV-2) (CORONAVIRUS DISEASE [COVID-19]), AMPLIFIED PROBE TECHNIQUE, MAKING USE OF HIGH THROUGHPUT TECHNOLOGIES AS DESCRIBED BY CMS-2020-01-R: HCPCS

## 2020-10-28 LAB
SARS-COV-2: NOT DETECTED
SOURCE: NORMAL

## 2020-11-03 LAB
SARS-COV-2: NOT DETECTED
SOURCE: NORMAL

## 2020-11-14 LAB
SARS-COV-2: NOT DETECTED
SOURCE: NORMAL

## 2020-11-22 LAB
SARS-COV-2: NOT DETECTED
SOURCE: NORMAL

## 2020-12-02 LAB
SARS-COV-2: NOT DETECTED
SOURCE: NORMAL

## 2020-12-16 LAB
SARS-COV-2: NOT DETECTED
SOURCE: NORMAL

## 2020-12-19 LAB
SARS-COV-2: NOT DETECTED
SOURCE: NORMAL

## 2020-12-29 LAB
SARS-COV-2: NOT DETECTED
SOURCE: NORMAL

## 2021-01-06 LAB
SARS-COV-2: NOT DETECTED
SOURCE: NORMAL

## 2021-01-09 LAB
SARS-COV-2: NOT DETECTED
SOURCE: NORMAL

## 2021-01-27 LAB
SARS-COV-2: NOT DETECTED
SOURCE: NORMAL

## 2021-01-30 LAB
SARS-COV-2: NOT DETECTED
SOURCE: NORMAL

## 2021-02-17 LAB
SARS-COV-2: NOT DETECTED
SOURCE: NORMAL

## 2021-06-04 LAB
SARS-COV-2: NOT DETECTED
SOURCE: NORMAL

## 2021-06-13 LAB
SARS-COV-2: NOT DETECTED
SOURCE: NORMAL

## 2022-01-05 LAB
SARS-COV-2: NOT DETECTED
SOURCE: NORMAL

## 2022-01-14 LAB
SARS-COV-2: NOT DETECTED
SOURCE: NORMAL

## 2022-04-20 LAB
SARS-COV-2: NOT DETECTED
SOURCE: NORMAL

## 2022-04-23 LAB
SARS-COV-2: NOT DETECTED
SOURCE: NORMAL

## 2022-05-01 LAB — SARS-COV-2, PCR: NOT DETECTED

## 2022-05-04 LAB — SARS-COV-2, PCR: NOT DETECTED

## 2022-06-02 LAB
SARS-COV-2: NOT DETECTED
SOURCE: NORMAL

## 2022-06-14 LAB — SARS-COV-2, PCR: NOT DETECTED

## 2022-06-22 LAB — SARS-COV-2, PCR: NOT DETECTED

## 2022-06-25 LAB
SARS-COV-2: NOT DETECTED
SOURCE: NORMAL

## 2022-07-07 LAB — SARS-COV-2, PCR: NOT DETECTED

## 2022-07-09 LAB
SARS-COV-2: NOT DETECTED
SOURCE: NORMAL

## 2022-07-23 LAB
SARS-COV-2: NOT DETECTED
SOURCE: NORMAL

## 2022-07-27 LAB
SARS-COV-2: NOT DETECTED
SOURCE: NORMAL

## 2022-07-30 LAB
SARS-COV-2: NOT DETECTED
SOURCE: NORMAL

## 2022-09-29 LAB
SARS-COV-2: NOT DETECTED
SOURCE: NORMAL

## 2022-11-02 LAB — SARS-COV-2, PCR: NOT DETECTED

## 2022-11-16 LAB
SARS-COV-2: NOT DETECTED
SOURCE: NORMAL

## 2022-11-19 LAB
SARS-COV-2: NOT DETECTED
SOURCE: NORMAL

## 2022-12-10 LAB
SARS-COV-2: NOT DETECTED
SOURCE: NORMAL

## 2022-12-23 LAB
SARS-COV-2: NOT DETECTED
SOURCE: NORMAL

## 2022-12-30 LAB
SARS-COV-2: NOT DETECTED
SOURCE: NORMAL

## 2023-01-05 LAB
SARS-COV-2: NOT DETECTED
SOURCE: NORMAL

## 2023-01-26 LAB
SARS-COV-2: NOT DETECTED
SOURCE: NORMAL

## 2023-02-18 LAB
SARS-COV-2: NOT DETECTED
SOURCE: NORMAL

## (undated) DEVICE — CANNULA NSL ORAL AD FOR CAPNOFLEX CO2 O2 AIRLFE

## (undated) DEVICE — PONSKY PEG SAFETY SYSTEM: Brand: PONSKY PEG SAFETY SYSTEM

## (undated) DEVICE — BINDER ABD SM MED 30 IN - 45 IN HT 12 IN